# Patient Record
Sex: MALE | Race: WHITE | NOT HISPANIC OR LATINO | Employment: OTHER
[De-identification: names, ages, dates, MRNs, and addresses within clinical notes are randomized per-mention and may not be internally consistent; named-entity substitution may affect disease eponyms.]

---

## 2017-01-31 ENCOUNTER — OFFICE VISIT - HEALTHEAST (OUTPATIENT)
Dept: PODIATRY | Age: 47
End: 2017-01-31

## 2017-01-31 DIAGNOSIS — G57.92 NEURITIS OF LEFT FOOT: ICD-10-CM

## 2021-05-26 ENCOUNTER — RECORDS - HEALTHEAST (OUTPATIENT)
Dept: ADMINISTRATIVE | Facility: CLINIC | Age: 51
End: 2021-05-26

## 2021-05-27 ENCOUNTER — RECORDS - HEALTHEAST (OUTPATIENT)
Dept: ADMINISTRATIVE | Facility: CLINIC | Age: 51
End: 2021-05-27

## 2021-05-28 ENCOUNTER — RECORDS - HEALTHEAST (OUTPATIENT)
Dept: ADMINISTRATIVE | Facility: CLINIC | Age: 51
End: 2021-05-28

## 2021-05-29 ENCOUNTER — RECORDS - HEALTHEAST (OUTPATIENT)
Dept: ADMINISTRATIVE | Facility: CLINIC | Age: 51
End: 2021-05-29

## 2021-05-30 ENCOUNTER — RECORDS - HEALTHEAST (OUTPATIENT)
Dept: ADMINISTRATIVE | Facility: CLINIC | Age: 51
End: 2021-05-30

## 2021-05-31 ENCOUNTER — RECORDS - HEALTHEAST (OUTPATIENT)
Dept: ADMINISTRATIVE | Facility: CLINIC | Age: 51
End: 2021-05-31

## 2021-06-08 NOTE — PROGRESS NOTES
Assessment: Left foot neuritis    Plan: Symptoms are improving already. He is content to wait this out. He understands it may take more than a month to completely regain sensation in this area. If symptoms fail to improve, return to clinic. We might consider a cortisone injection or removing the tiny lipoma.      Subjective: New patient visit at the [facility] regarding altered sensation on the medial side of his left heel. This started after a normal hockey game about two weeks ago. He does not recall any injury. The next day he had exquisite tenderness with weightbearing on the heel. He was seen in the emergency room on January 18. X-rays and ultrasound were unremarkable. Pain was improving with Percocet. He only took that twice. Today, he still has a patch of numbness on the medial and plantar side of the left heel. Pain is gone. He has been able to walk and return to hockey without pain. There is a small soft tissue prominence where the pain started. He is not sure how long it been there. There was never any open wound, bruising or other discoloration.    Physical Exam:  General: Pleasant 46 y.o. male in no acute distress.  Vascular: DP pulses are palpable. PT pulses are palpable. Pedal hair is present. Feet are warm to the touch.  Neuro: Sensation in the feet is altered. He points through the distribution of the plantar calcaneal nerve as numb today. Sensation to the toes and the rest of the plantar foot is intact.  Derm: No open lesions.  Musculoskeletal: On the medial side of the heel there is a tiny subdermal soft tissue prominence which feels like a lipoma. This sits over the calcaneal branch of the posterior tibial nerve. No tenderness there today. X-ray and ultrasound results from the emergency room are reviewed and are negative.    Medical History  Past Medical History   Diagnosis Date     Anxiety      Hiatal hernia      Hypertension      Neuropathy with IgA monoclonal gammopathy     Surgical  History   has a past surgical history that includes Appendectomy.   Social History  Reviewed, and he  reports that he has never smoked. He does not have any smokeless tobacco history on file. He reports that he drinks alcohol. He reports that he uses illicit drugs.   Allergies  Allergies   Allergen Reactions     Ibuprofen Other (See Comments)     Kidney problems   IGA nephrothy    Family History  family history includes Depression in his mother; Diabetes in his maternal grandfather, maternal grandmother, and mother; Drug abuse in his mother; Heart disease in his maternal grandfather, maternal grandmother, and paternal grandmother; Hypertension in his maternal grandfather, maternal grandmother, and paternal grandmother.      Medications  Current Outpatient Prescriptions   Medication Sig Dispense Refill     aspirin 325 MG EC tablet Take 650 mg by mouth.       ketoconazole (NIZORAL) 2 % cream Apply topically.       acetaminophen (TYLENOL) 500 MG tablet Take 500-1,000 mg by mouth every 6 (six) hours as needed for pain.       atorvastatin (LIPITOR) 40 MG tablet TK 1 T PO QD WITH DINNER  1     diazepam (VALIUM) 5 MG tablet Take 5 mg by mouth every 6 (six) hours as needed for anxiety.       lisinopril-hydrochlorothiazide (PRINZIDE,ZESTORETIC) 10-12.5 mg per tablet TK 1 T PO QD  1     metoprolol (LOPRESSOR) 25 MG tablet Take 1 tablet (25 mg total) by mouth 2 (two) times a day. 60 tablet 1     No current facility-administered medications for this visit.          Review of Systems:  A 12 point comprehensive review of systems was negative except as noted.

## 2021-08-23 ENCOUNTER — APPOINTMENT (OUTPATIENT)
Dept: CT IMAGING | Facility: HOSPITAL | Age: 51
End: 2021-08-23
Attending: EMERGENCY MEDICINE
Payer: COMMERCIAL

## 2021-08-23 ENCOUNTER — HOSPITAL ENCOUNTER (EMERGENCY)
Facility: HOSPITAL | Age: 51
Discharge: HOME | End: 2021-08-24
Attending: EMERGENCY MEDICINE | Admitting: EMERGENCY MEDICINE
Payer: COMMERCIAL

## 2021-08-23 VITALS
BODY MASS INDEX: 28.98 KG/M2 | HEART RATE: 74 BPM | SYSTOLIC BLOOD PRESSURE: 186 MMHG | DIASTOLIC BLOOD PRESSURE: 97 MMHG | OXYGEN SATURATION: 97 % | WEIGHT: 185 LBS | RESPIRATION RATE: 29 BRPM | TEMPERATURE: 98.7 F

## 2021-08-23 DIAGNOSIS — E86.0 DEHYDRATION: ICD-10-CM

## 2021-08-23 DIAGNOSIS — R19.7 NAUSEA VOMITING AND DIARRHEA: ICD-10-CM

## 2021-08-23 DIAGNOSIS — R80.9 PROTEINURIA, UNSPECIFIED TYPE: ICD-10-CM

## 2021-08-23 DIAGNOSIS — R11.2 NAUSEA VOMITING AND DIARRHEA: ICD-10-CM

## 2021-08-23 LAB
ALBUMIN UR-MCNC: 300 MG/DL
ANION GAP SERPL CALCULATED.3IONS-SCNC: 12 MMOL/L (ref 5–18)
APPEARANCE UR: CLEAR
BILIRUB UR QL STRIP: NEGATIVE
BUN SERPL-MCNC: 18 MG/DL (ref 8–22)
CALCIUM SERPL-MCNC: 10.6 MG/DL (ref 8.5–10.5)
CHLORIDE BLD-SCNC: 103 MMOL/L (ref 98–107)
CO2 SERPL-SCNC: 25 MMOL/L (ref 22–31)
COLOR UR AUTO: YELLOW
CREAT SERPL-MCNC: 0.91 MG/DL (ref 0.7–1.3)
ERYTHROCYTE [DISTWIDTH] IN BLOOD BY AUTOMATED COUNT: 13.3 % (ref 10–15)
GFR SERPL CREATININE-BSD FRML MDRD: >90 ML/MIN/1.73M2
GLUCOSE BLD-MCNC: 141 MG/DL (ref 70–125)
GLUCOSE UR STRIP-MCNC: NEGATIVE MG/DL
HCT VFR BLD AUTO: 49 % (ref 40–53)
HGB BLD-MCNC: 16.5 G/DL (ref 13.3–17.7)
HGB UR QL STRIP: ABNORMAL
HOLD SPECIMEN: NORMAL
KETONES UR STRIP-MCNC: 40 MG/DL
LEUKOCYTE ESTERASE UR QL STRIP: NEGATIVE
MCH RBC QN AUTO: 30.5 PG (ref 26.5–33)
MCHC RBC AUTO-ENTMCNC: 33.7 G/DL (ref 31.5–36.5)
MCV RBC AUTO: 91 FL (ref 78–100)
MUCOUS THREADS #/AREA URNS LPF: PRESENT /LPF
NITRATE UR QL: NEGATIVE
PH UR STRIP: 5.5 [PH] (ref 5–7)
PLATELET # BLD AUTO: 244 10E3/UL (ref 150–450)
POTASSIUM BLD-SCNC: 4.3 MMOL/L (ref 3.5–5)
RBC # BLD AUTO: 5.41 10E6/UL (ref 4.4–5.9)
RBC URINE: 1 /HPF
SARS-COV-2 RNA RESP QL NAA+PROBE: NEGATIVE
SODIUM SERPL-SCNC: 140 MMOL/L (ref 136–145)
SP GR UR STRIP: 1.03 (ref 1–1.03)
UROBILINOGEN UR STRIP-MCNC: <2 MG/DL
WBC # BLD AUTO: 12.9 10E3/UL (ref 4–11)
WBC URINE: 3 /HPF

## 2021-08-23 PROCEDURE — 99285 EMERGENCY DEPT VISIT HI MDM: CPT | Mod: 25

## 2021-08-23 PROCEDURE — 81001 URINALYSIS AUTO W/SCOPE: CPT | Performed by: EMERGENCY MEDICINE

## 2021-08-23 PROCEDURE — 74176 CT ABD & PELVIS W/O CONTRAST: CPT

## 2021-08-23 PROCEDURE — 96374 THER/PROPH/DIAG INJ IV PUSH: CPT

## 2021-08-23 PROCEDURE — 250N000013 HC RX MED GY IP 250 OP 250 PS 637: Performed by: EMERGENCY MEDICINE

## 2021-08-23 PROCEDURE — 250N000009 HC RX 250: Performed by: EMERGENCY MEDICINE

## 2021-08-23 PROCEDURE — 85027 COMPLETE CBC AUTOMATED: CPT | Performed by: STUDENT IN AN ORGANIZED HEALTH CARE EDUCATION/TRAINING PROGRAM

## 2021-08-23 PROCEDURE — 250N000011 HC RX IP 250 OP 636: Performed by: STUDENT IN AN ORGANIZED HEALTH CARE EDUCATION/TRAINING PROGRAM

## 2021-08-23 PROCEDURE — 250N000011 HC RX IP 250 OP 636: Performed by: EMERGENCY MEDICINE

## 2021-08-23 PROCEDURE — 96375 TX/PRO/DX INJ NEW DRUG ADDON: CPT

## 2021-08-23 PROCEDURE — 96361 HYDRATE IV INFUSION ADD-ON: CPT

## 2021-08-23 PROCEDURE — C9803 HOPD COVID-19 SPEC COLLECT: HCPCS

## 2021-08-23 PROCEDURE — 36415 COLL VENOUS BLD VENIPUNCTURE: CPT | Performed by: STUDENT IN AN ORGANIZED HEALTH CARE EDUCATION/TRAINING PROGRAM

## 2021-08-23 PROCEDURE — 87635 SARS-COV-2 COVID-19 AMP PRB: CPT | Performed by: EMERGENCY MEDICINE

## 2021-08-23 PROCEDURE — 80048 BASIC METABOLIC PNL TOTAL CA: CPT | Performed by: EMERGENCY MEDICINE

## 2021-08-23 PROCEDURE — 85027 COMPLETE CBC AUTOMATED: CPT | Performed by: EMERGENCY MEDICINE

## 2021-08-23 PROCEDURE — 81003 URINALYSIS AUTO W/O SCOPE: CPT | Performed by: STUDENT IN AN ORGANIZED HEALTH CARE EDUCATION/TRAINING PROGRAM

## 2021-08-23 PROCEDURE — 258N000003 HC RX IP 258 OP 636: Performed by: EMERGENCY MEDICINE

## 2021-08-23 RX ORDER — ONDANSETRON 2 MG/ML
8 INJECTION INTRAMUSCULAR; INTRAVENOUS ONCE
Status: COMPLETED | OUTPATIENT
Start: 2021-08-23 | End: 2021-08-23

## 2021-08-23 RX ORDER — ONDANSETRON 4 MG/1
4-8 TABLET, ORALLY DISINTEGRATING ORAL EVERY 8 HOURS PRN
Qty: 16 TABLET | Refills: 0 | Status: SHIPPED | OUTPATIENT
Start: 2021-08-23 | End: 2021-08-27

## 2021-08-23 RX ORDER — ONDANSETRON 4 MG/1
4 TABLET, ORALLY DISINTEGRATING ORAL ONCE
Status: COMPLETED | OUTPATIENT
Start: 2021-08-23 | End: 2021-08-23

## 2021-08-23 RX ORDER — SODIUM CHLORIDE, SODIUM LACTATE, POTASSIUM CHLORIDE, CALCIUM CHLORIDE 600; 310; 30; 20 MG/100ML; MG/100ML; MG/100ML; MG/100ML
INJECTION, SOLUTION INTRAVENOUS CONTINUOUS
Status: DISCONTINUED | OUTPATIENT
Start: 2021-08-23 | End: 2021-08-24 | Stop reason: HOSPADM

## 2021-08-23 RX ORDER — LOSARTAN POTASSIUM 50 MG/1
50 TABLET ORAL DAILY
Status: DISCONTINUED | OUTPATIENT
Start: 2021-08-24 | End: 2021-08-23

## 2021-08-23 RX ORDER — LOSARTAN POTASSIUM 50 MG/1
50 TABLET ORAL DAILY
Status: DISCONTINUED | OUTPATIENT
Start: 2021-08-23 | End: 2021-08-24 | Stop reason: HOSPADM

## 2021-08-23 RX ADMIN — SODIUM CHLORIDE, POTASSIUM CHLORIDE, SODIUM LACTATE AND CALCIUM CHLORIDE 1000 ML: 600; 310; 30; 20 INJECTION, SOLUTION INTRAVENOUS at 21:29

## 2021-08-23 RX ADMIN — LOSARTAN POTASSIUM 50 MG: 50 TABLET, FILM COATED ORAL at 23:21

## 2021-08-23 RX ADMIN — SODIUM CHLORIDE, POTASSIUM CHLORIDE, SODIUM LACTATE AND CALCIUM CHLORIDE 1000 ML: 600; 310; 30; 20 INJECTION, SOLUTION INTRAVENOUS at 21:28

## 2021-08-23 RX ADMIN — ONDANSETRON 4 MG: 4 TABLET, ORALLY DISINTEGRATING ORAL at 18:45

## 2021-08-23 RX ADMIN — FAMOTIDINE 20 MG: 10 INJECTION, SOLUTION INTRAVENOUS at 21:24

## 2021-08-23 RX ADMIN — ONDANSETRON 8 MG: 2 INJECTION INTRAMUSCULAR; INTRAVENOUS at 21:21

## 2021-08-23 ASSESSMENT — ENCOUNTER SYMPTOMS
FEVER: 0
DIAPHORESIS: 1
CHILLS: 1
DYSURIA: 0
DIARRHEA: 1
FREQUENCY: 0
NAUSEA: 1
VOMITING: 1

## 2021-08-24 NOTE — ED NOTES
Pt reports much improvement in nausea. No need for more antiemetics at this time. Pt will be going to CT.

## 2021-08-24 NOTE — ED NOTES
Pt reports emesis x 3 and continued dry heaves after zofran dose while in triage. He denies ETOH use but reports daily use of marijuana. Missed BP med today due to vomiting. /117. Provider updated.

## 2021-08-24 NOTE — ED PROVIDER NOTES
Emergency Department Encounter     Evaluation Date & Time:   8/23/2021  8:32 PM    CHIEF COMPLAINT:  Chills and Vomiting      Triage Note:Woke up at 0300 diaphoretic and vomiting.   No cough or sob.         Impression and Plan     ED COURSE & MEDICAL DECISION MAKING:      ED Course as of Aug 23 2329   Mon Aug 23, 2021   2105 Patient with recurrent nausea vomiting and diarrhea today.  Labs done in triage do show mildly elevated white blood cell count.  They did a urinalysis on him but he has no urinary symptoms and it appears extremely concentrated with protein.  He does have a history of high blood pressure and so he may need follow-up for the protein in his urine but they can also help and will sample is so concentrated including the ketones from recurrent emesis.  For now, his abdomen is benign, but given his persistent chills today and his age I would do CT to rule out infectious cause such as diverticulitis or colitis.  Most recent travel was 2 weeks ago but he has not had any symptoms until just yesterday so I think doubtful was secondary to any travel related diarrhea.  He was just in the \[facility]\"" for [employer].  Otherwise, he was exposed to a large number of people 2 nights ago for a work-related event.  He is concerned for Covid.  He does not know of anyone in particular who had it.  He had no symptoms yesterday so doubtful this is food related from that event and his wife is not sick today to suggest something that they ate together over the course of the last 24 hours.  Causing his symptoms.  If CT is negative this may end up being viral.  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  No other street drug use and he does not drink alcohol.  No tongue fasciculations or tremulousness on exam.      2203 Improved after zofran declined promethazine at this time.       2317 Patient requested his daily dose of losartan which he had previously vomited.  This was given to him.  Patient CT is unremarkable.  No chest pain or shortness of breath to suggest that the cause of his symptoms is related to his blood pressure.  Neuro examination is normal.  Patient feels much improved after his initial fluids and Zofran.  He is comfortable with the plan to discharge home.            9:00 PM I met with the patient, obtained history, performed an initial exam, and discussed options and plan for diagnostics and treatment here in the ED.    At the conclusion of the encounter I discussed the results of all the tests and the disposition. The questions were answered. The patient or family acknowledged understanding and was agreeable with the care plan.        FINAL IMPRESSION:    ICD-10-CM    1. Nausea vomiting and diarrhea  R11.2     R19.7    2. Dehydration  E86.0    3. Proteinuria, unspecified type  R80.9        0 minutes of critical care time        MEDICATIONS GIVEN IN THE EMERGENCY DEPARTMENT:  Medications   lactated ringers BOLUS 1,000 mL (1,000 mLs Intravenous New Bag 8/23/21 2129)     Followed by   lactated ringers BOLUS 1,000 mL (0 mLs Intravenous Stopped 8/23/21 2320)     Followed by   lactated ringers infusion (has no administration in time range)   promethazine (PHENERGAN) 25 mg in sodium chloride 0.9 % 50 mL intermittent infusion (has no administration in time range)   losartan (COZAAR) tablet 50 mg (50 mg Oral Given 8/23/21 2321)   ondansetron (ZOFRAN-ODT) ODT tab 4 mg (4 mg Oral Given 8/23/21 1845)   ondansetron (ZOFRAN) injection 8 mg (8 mg Intravenous Given 8/23/21 2121)   famotidine (PEPCID) injection 20 mg (20 mg Intravenous Given 8/23/21 2124)       NEW PRESCRIPTIONS STARTED AT TODAY'S ED VISIT:  New Prescriptions    ONDANSETRON (ZOFRAN ODT) 4 MG ODT TAB    Take 1-2 tablets (4-8 mg) by mouth every 8 hours as needed for nausea or vomiting (max daily dose of 4 tablets)       HPI      HPI     Joseph Ochoa is a 51 year old male with a pertinent history of hyperlipidemia, HTN, gastritis, GERD, and neuropathy who presents to this ED via walk-in for evaluation of chills and vomiting.    Patient reports profuse sweating, chills, diarrhea, vomiting, and heaving, earlier today after he woke up around 1500. Patient reports history of nausea before (x2), however he has never experienced diaphoresis and chills with the nausea. He states that diarrhea was normal-colored. Patient denies fever, dysuria, and frequency. Patient reports he was at a benefit on 8/21/21 with about 20-50 people in attendance. Patient denies any symptoms yesterday    Patient has no history of diverticulitis. Patient reports marijuana use everyday, with yesterday being the last time he used it. Patient occasionally takes Valium, which is prescribed. Patient does not consume alcohol, last drink was in May 2020. Patient currently takes Lisinopril and Omeprazole.     No other complaints at this tie.     REVIEW OF SYSTEMS:  Review of Systems   Constitutional: Positive for chills and diaphoresis. Negative for fever.   Gastrointestinal: Positive for diarrhea, nausea and vomiting.   Genitourinary: Negative for dysuria and frequency.   All other systems reviewed and are negative.        Medical History     No past medical history on file.    Past Surgical History:   Procedure Laterality Date     APPENDECTOMY         Family History   Problem Relation Age of Onset     Depression Mother      Diabetes Mother      Substance Abuse Mother      Diabetes Maternal Grandmother      Hypertension Maternal Grandmother      Heart Disease Maternal Grandmother      Diabetes Maternal Grandfather      Hypertension Maternal Grandfather      Heart Disease Maternal Grandfather      Heart Disease Paternal Grandmother      Hypertension Paternal Grandmother        Social History     Tobacco Use     Smoking status: Never Smoker     Smokeless tobacco: Never  Used   Substance Use Topics     Alcohol use: Yes     Comment: Alcoholic Drinks/day: occasional     Drug use: Yes     Types: Marijuana     Comment: Drug use: Daily marijuana use       ondansetron (ZOFRAN ODT) 4 MG ODT tab  atorvastatin (LIPITOR) 40 MG tablet  diazepam (VALIUM) 5 MG tablet  losartan (COZAAR) 50 MG tablet  omeprazole (PRILOSEC) 20 MG capsule        Physical Exam     First Vitals:  Patient Vitals for the past 24 hrs:   BP Temp Temp src Pulse Resp SpO2 Weight   08/23/21 2320 -- -- -- 74 29 97 % --   08/23/21 2300 (!) 186/97 -- -- 77 24 97 % --   08/23/21 2247 -- 98.7  F (37.1  C) Oral -- -- -- --   08/23/21 2240 (!) 204/101 -- -- -- -- -- --   08/23/21 2200 (!) 199/97 -- -- 69 25 95 % --   08/23/21 2150 (!) 202/106 -- -- 70 29 97 % --   08/23/21 2100 (!) 250/118 -- -- 68 21 96 % --   08/23/21 2045 (!) 220/107 -- -- 64 25 96 % --   08/23/21 1841 (!) 157/97 96.8  F (36  C) Temporal 67 20 94 % 83.9 kg (185 lb)       PHYSICAL EXAM:   Constitutional:   Sitting upright in bed.    HENT:  Normocephalic, posterior pharynx wnl, mucous membranes tacky and moderately pink   Eyes:  PERRL, EOMI, Conjunctiva normal, No discharge, no scleral icterus.  Respiratory:  Breathing easily, clear to auscultation  Cardiovascular:  RRR, trace systolic murmur, nl s1s2 0 rubs, or gallops.  Peripheral pulses dp, pt, and radial are wnl.  No peripheral edema   GI:  Bowel sounds hypoactive, Soft, No tenderness, No flank tenderness, nondistended.  :No CVA tenderness.   Musculoskeletal:  Moves all extremities.  No erythematous or swollen major joints,   Integument:  Normal  Lymphatic:  No cervical lymphadenopathy  Neurologic:  Alert & oriented x 3, Normal motor function, Normal sensory function, No focal deficits noted. Normal speech.  Psychiatric:  Affect normal, Judgment normal, Mood normal.     Results     LAB:  All pertinent labs reviewed and interpreted  Results for orders placed or performed during the hospital encounter of  08/23/21   Abd/pelvis CT no contrast - Stone Protocol     Status: None    Narrative    EXAM: CT ABDOMEN PELVIS W/O CONTRAST  LOCATION: [facility]  DATE/TIME: 8/23/2021 10:28 PM    INDICATION: recurrent n/v/d.  has hx/o daily marijuana use, but had chills all day today.  COMPARISON: 12/30/1950  TECHNIQUE: CT scan of the abdomen and pelvis was performed without IV contrast. Multiplanar reformats were obtained. Dose reduction techniques were used.  CONTRAST: None.    FINDINGS:   LOWER CHEST: Normal.    HEPATOBILIARY: Tiny hepatic cysts.    PANCREAS: Normal.    SPLEEN: Normal.    ADRENAL GLANDS: Normal.    KIDNEYS/BLADDER: Normal.    BOWEL: Colon is mostly collapsed and empty consistent with diarrhea history. There is no convincing colonic wall thickening. No bowel obstruction.    LYMPH NODES: Normal.    VASCULATURE: Unremarkable.    PELVIC ORGANS: Normal.    MUSCULOSKELETAL: Normal.      Impression    IMPRESSION:   1.  No etiology for symptoms. Nothing obstructive or inflammatory involving bowel.     UA with Microscopic reflex to Culture     Status: Abnormal    Specimen: Urine, Midstream   Result Value Ref Range    Color Urine Yellow Colorless, Straw, Light Yellow, Yellow    Appearance Urine Clear Clear    Glucose Urine Negative Negative mg/dL    Bilirubin Urine Negative Negative    Ketones Urine 40  (A) Negative mg/dL    Specific Gravity Urine 1.034 (H) 1.001 - 1.030    Blood Urine 0.03 mg/dL (A) Negative    pH Urine 5.5 5.0 - 7.0    Protein Albumin Urine 300  (A) Negative mg/dL    Urobilinogen Urine <2.0 <2.0 mg/dL    Nitrite Urine Negative Negative    Leukocyte Esterase Urine Negative Negative    Mucus Urine Present (A) None Seen /LPF    RBC Urine 1 <=2 /HPF    WBC Urine 3 <=5 /HPF    Narrative    Urine Culture not indicated   CBC (+ platelets, no diff)     Status: Abnormal   Result Value Ref Range    WBC Count 12.9 (H) 4.0 - 11.0 10e3/uL    RBC Count 5.41 4.40 - 5.90 10e6/uL    Hemoglobin  16.5 13.3 - 17.7 g/dL    Hematocrit 49.0 40.0 - 53.0 %    MCV 91 78 - 100 fL    MCH 30.5 26.5 - 33.0 pg    MCHC 33.7 31.5 - 36.5 g/dL    RDW 13.3 10.0 - 15.0 %    Platelet Count 244 150 - 450 10e3/uL   Basic metabolic panel     Status: Abnormal   Result Value Ref Range    Sodium 140 136 - 145 mmol/L    Potassium 4.3 3.5 - 5.0 mmol/L    Chloride 103 98 - 107 mmol/L    Carbon Dioxide (CO2) 25 22 - 31 mmol/L    Anion Gap 12 5 - 18 mmol/L    Urea Nitrogen 18 8 - 22 mg/dL    Creatinine 0.91 0.70 - 1.30 mg/dL    Calcium 10.6 (H) 8.5 - 10.5 mg/dL    Glucose 141 (H) 70 - 125 mg/dL    GFR Estimate >90 >60 mL/min/1.73m2   Extra Red Top Tube     Status: None   Result Value Ref Range    Hold Specimen JIC    Extra Green Top (Lithium Heparin) Tube     Status: None   Result Value Ref Range    Hold Specimen JIC    Extra Purple Top Tube     Status: None   Result Value Ref Range    Hold Specimen JIC    Symptomatic COVID-19 Virus (Coronavirus) by PCR Nasopharyngeal     Status: Normal    Specimen: Nasopharyngeal; Swab   Result Value Ref Range    SARS CoV2 PCR Negative Negative    Narrative    Testing was performed using the rell  SARS-CoV-2 & Influenza A/B Assay on the rell  Goldie  System.  This test should be ordered for the detection of SARS-COV-2 in individuals who meet SARS-CoV-2 clinical and/or epidemiological criteria. Test performance is unknown in asymptomatic patients.  This test is for in vitro diagnostic use under the FDA EUA for laboratories certified under CLIA to perform moderate and/or high complexity testing. This test has not been FDA cleared or approved.  A negative test does not rule out the presence of PCR inhibitors in the specimen or target RNA in concentration below the limit of detection for the assay. The possibility of a false negative should be considered if the patient's recent exposure or clinical presentation suggests COVID-19.  [facility] are certified under the Clinical Laboratory  Improvement Amendments of 1988 (CLIA-88) as qualified to perform moderate and/or high complexity laboratory testing.   [place] Draw     Status: None    Narrative    The following orders were created for panel order [place] Draw.  Procedure                               Abnormality         Status                     ---------                               -----------         ------                     Extra Red Top Tube[[id number]]                               Final result               Extra Green Top (Lithium...[[id number]]                      Final result               Extra Purple Top Tube[[id number]]                            Final result                 Please view results for these tests on the individual orders.       RADIOLOGY:  Abd/pelvis CT no contrast - Stone Protocol   Final Result   IMPRESSION:    1.  No etiology for symptoms. Nothing obstructive or inflammatory involving bowel.                  The creation of this record is based on the scribe s observations of the work being performed by Rose Simental and the provider s statements to them. This document has been checked and approved by MD Little Mcdermott MD  Emergency Medicine  [facility] EMERGENCY DEPARTMENT       Little Partida MD  08/23/21 3241

## 2021-08-24 NOTE — DISCHARGE INSTRUCTIONS
Your Covid test today is negative but if you are still running fevers in 3 days at that point I would recommend that you get retested before returning to the work environment and until then you should stay isolated at home.    In the meantime you can use over-the-counter Imodium for diarrhea or if you prefer certainly you can also use Pepto-Bismol.  If you take Pepto-Bismol please be aware that that will turn your stools quite dark blackish green.    Use the Zofran/ondansetron as needed for the nausea.    You should see your primary care physician just to retest your urine once you are feeling improved.  There was a little bit of protein in your urine which is most likely because you were severely dehydrated with this, but it can occasionally occur and is a warning sign of early kidney disease related to high blood pressure.  A simple recheck of your urine will be helpful done through your clinic once you are recovered.

## 2021-10-18 PROCEDURE — 88305 TISSUE EXAM BY PATHOLOGIST: CPT | Mod: TC,ORL | Performed by: SURGERY

## 2021-10-19 ENCOUNTER — LAB REQUISITION (OUTPATIENT)
Dept: LAB | Facility: CLINIC | Age: 51
End: 2021-10-19
Payer: COMMERCIAL

## 2021-10-21 LAB
PATH REPORT.COMMENTS IMP SPEC: NORMAL
PATH REPORT.COMMENTS IMP SPEC: NORMAL
PATH REPORT.FINAL DX SPEC: NORMAL
PATH REPORT.GROSS SPEC: NORMAL
PATH REPORT.MICROSCOPIC SPEC OTHER STN: NORMAL
PATH REPORT.RELEVANT HX SPEC: NORMAL
PHOTO IMAGE: NORMAL

## 2021-10-21 PROCEDURE — 88305 TISSUE EXAM BY PATHOLOGIST: CPT | Mod: 26 | Performed by: PATHOLOGY

## 2022-07-23 ENCOUNTER — HOSPITAL ENCOUNTER (EMERGENCY)
Facility: HOSPITAL | Age: 52
Discharge: HOME | End: 2022-07-23
Attending: EMERGENCY MEDICINE | Admitting: EMERGENCY MEDICINE
Payer: COMMERCIAL

## 2022-07-23 VITALS
SYSTOLIC BLOOD PRESSURE: 165 MMHG | HEART RATE: 85 BPM | TEMPERATURE: 98.2 F | RESPIRATION RATE: 22 BRPM | WEIGHT: 183 LBS | OXYGEN SATURATION: 95 % | DIASTOLIC BLOOD PRESSURE: 104 MMHG | BODY MASS INDEX: 28.66 KG/M2

## 2022-07-23 DIAGNOSIS — S46.119A RUPTURE OF PROXIMAL BICEPS TENDON, UNSPECIFIED LATERALITY, INITIAL ENCOUNTER: ICD-10-CM

## 2022-07-23 PROBLEM — N02.B9 NEPHROPATHY, IGA: Status: ACTIVE | Noted: 2022-07-23

## 2022-07-23 PROBLEM — Z80.9 FAMILY HISTORY OF CANCER: Status: ACTIVE | Noted: 2022-07-23

## 2022-07-23 PROBLEM — I10 ESSENTIAL HYPERTENSION: Status: ACTIVE | Noted: 2019-01-22

## 2022-07-23 PROBLEM — N40.0 BPH WITHOUT URINARY OBSTRUCTION: Status: ACTIVE | Noted: 2018-03-26

## 2022-07-23 PROBLEM — M79.643 HAND PAIN: Status: ACTIVE | Noted: 2022-07-23

## 2022-07-23 PROBLEM — K21.9 GASTROESOPHAGEAL REFLUX DISEASE WITHOUT ESOPHAGITIS: Status: ACTIVE | Noted: 2018-04-10

## 2022-07-23 PROBLEM — F41.0 PANIC DISORDER: Status: ACTIVE | Noted: 2022-07-23

## 2022-07-23 PROCEDURE — 99284 EMERGENCY DEPT VISIT MOD MDM: CPT

## 2022-07-23 PROCEDURE — 250N000013 HC RX MED GY IP 250 OP 250 PS 637: Performed by: EMERGENCY MEDICINE

## 2022-07-23 RX ORDER — CYCLOBENZAPRINE HCL 10 MG
10 TABLET ORAL 3 TIMES DAILY PRN
Qty: 20 TABLET | Refills: 0 | Status: SHIPPED | OUTPATIENT
Start: 2022-07-23 | End: 2022-07-29

## 2022-07-23 RX ORDER — OXYCODONE AND ACETAMINOPHEN 5; 325 MG/1; MG/1
2 TABLET ORAL ONCE
Status: COMPLETED | OUTPATIENT
Start: 2022-07-23 | End: 2022-07-23

## 2022-07-23 RX ORDER — OXYCODONE AND ACETAMINOPHEN 5; 325 MG/1; MG/1
1 TABLET ORAL EVERY 6 HOURS PRN
Qty: 12 TABLET | Refills: 0 | Status: SHIPPED | OUTPATIENT
Start: 2022-07-23

## 2022-07-23 RX ADMIN — OXYCODONE HYDROCHLORIDE AND ACETAMINOPHEN 2 TABLET: 5; 325 TABLET ORAL at 12:23

## 2022-07-23 NOTE — ED PROVIDER NOTES
EMERGENCY DEPARTMENT ENCOUNTER      NAME: Joseph Ochoa  AGE: 52 year old male  YOB: 1970  MRN: [id number]  EVALUATION DATE & TIME: No admission date for patient encounter.    PCP: Osvaldo Telles    ED PROVIDER: Evaristo Quinonez MD    Chief Complaint   Patient presents with     Arm Injury     FINAL IMPRESSION:  1. Rupture of proximal biceps tendon, unspecified laterality, initial encounter        ED COURSE & MEDICAL DECISION MAKING:    Pertinent Labs & Imaging studies reviewed. (See chart for details)  52 year old male presents to the Emergency Department for evaluation of lifting injury to the right upper arm.  Patient felt a popping sensation in his biceps and is noted a deformity to the muscle since that occurred.  On clinical examination patient had findings consistent with a biceps tendon rupture likely proximal.  Distal neurovascular was intact.  Case was discussed with [facility].  Patient was placed in a sling with plan for MRI outpatient and quick orthopedic follow-up.  This was all discussed with the patient.  At this point I did not feel that imaging was indicated and patient can be discharged with close outpatient follow-up and pain management.     11:27 AM I met with the patient, obtained history, performed an initial exam, and discussed options and plan for diagnostics and treatment here in the ED.  11:41 AM I spoke with [facility] to PA.  12:33 PM I spoke with [facility] to Dr. Nixon.    At the conclusion of the encounter I discussed the results of all of the tests and the disposition. The questions were answered. The patient or family acknowledged understanding and was agreeable with the care plan.     MEDICATIONS GIVEN IN THE EMERGENCY:  Medications   oxyCODONE-acetaminophen (PERCOCET) 5-325 MG per tablet 2 tablet (2 tablets Oral Given 7/23/22 1223)     NEW PRESCRIPTIONS STARTED AT TODAY'S ER VISIT  Discharge Medication List as of 7/23/2022 12:24 PM     "  START taking these medications    Details   cyclobenzaprine (FLEXERIL) 10 MG tablet Take 1 tablet (10 mg) by mouth 3 times daily as needed for muscle spasms, Disp-20 tablet, R-0, Local Print      oxyCODONE-acetaminophen (PERCOCET) 5-325 MG tablet Take 1 tablet by mouth every 6 hours as needed for pain, Disp-12 tablet, R-0, Local Print                =================================================================    HPI    Patient information was obtained from: patient     Use of : N/A         Joseph Ochoa is a 52 year old male with a pertinent history of HTN, carpal tunnel syndrome, metacarpal bone fracture, and panic disorder who presents to this ED via walk-in for evaluation of an arm injury.    Last night while patient was lifting his friend into a car he felt his right bicep \"coil up\". He also noted a \"pop\" when it occurred and he has associated pain. He has taken Tylenol but it has not provided enough relief.      REVIEW OF SYSTEMS   Review of Systems   Musculoskeletal:        Positive for right arm pain        PAST MEDICAL HISTORY:  No past medical history on file.    PAST SURGICAL HISTORY:  Past Surgical History:   Procedure Laterality Date     APPENDECTOMY             CURRENT MEDICATIONS:    oxyCODONE-acetaminophen (PERCOCET) 5-325 MG tablet  atorvastatin (LIPITOR) 40 MG tablet  diazepam (VALIUM) 5 MG tablet  losartan (COZAAR) 50 MG tablet  omeprazole (PRILOSEC) 20 MG capsule        ALLERGIES:  Allergies   Allergen Reactions     Ibuprofen Other (See Comments)     Kidney problems   IGA nephrothy     Lisinopril Other (See Comments)       FAMILY HISTORY:  Family History   Problem Relation Age of Onset     Depression Mother      Diabetes Mother      Substance Abuse Mother      Diabetes Maternal Grandmother      Hypertension Maternal Grandmother      Heart Disease Maternal Grandmother      Diabetes Maternal Grandfather      Hypertension Maternal Grandfather      Heart Disease Maternal " Grandfather      Heart Disease Paternal Grandmother      Hypertension Paternal Grandmother        SOCIAL HISTORY:   Social History     Socioeconomic History     Marital status:    Tobacco Use     Smoking status: Never Smoker     Smokeless tobacco: Never Used   Substance and Sexual Activity     Alcohol use: Yes     Comment: Alcoholic Drinks/day: occasional     Drug use: Yes     Types: Marijuana     Comment: Drug use: Daily marijuana use     Sexual activity: Yes     Partners: Female       VITALS:  BP (!) 165/104   Pulse 85   Temp 98.2  F (36.8  C) (Oral)   Resp 22   Wt 83 kg (183 lb)   SpO2 95%   BMI 28.66 kg/m      PHYSICAL EXAM    PHYSICAL EXAM    VITAL SIGNS: BP (!) 165/104   Pulse 85   Temp 98.2  F (36.8  C) (Oral)   Resp 22   Wt 83 kg (183 lb)   SpO2 95%   BMI 28.66 kg/m    Constitutional:  Well developed, well nourished  EYES: Conjunctivae clear, no discharge  HENT: Atraumatic, normocephalic, bilateral external ears normal.  Oropharynx moist. Nose normal.   Neck: Normal ROM , Supple   Respiratory:  No respiratory distress, normal nonlabored respirations.   Cardiovascular:  Distal perfusion appears intact  Musculoskeletal: On examination of the right upper extremity is consistent with a high probability biceps tendon rupture.  Distal neurovascular is intact  Integument:  Warm, Dry, No erythema, No rash.   Neurologic:  Alert and oriented. No focal deficits noted.  Ambulatory  Psychiatric:  Affect normal, Judgment normal, Mood normal.    RADIOLOGY:  Reviewed all pertinent imaging. Please see official radiology report.  MR Shoulder Right w/o Contrast    (Results Pending)         I, Arnaud Fuentes, am serving as a scribe to document services personally performed by Evaristo Quinonez MD based on my observation and the provider's statements to me. I, Evaristo Quinonez MD, attest that Arnaud Fuentes is acting in a scribe capacity, has observed my performance of the services and has documented them in  accordance with my direction.    Evaristo Quinonez MD  [facility] EMERGENCY DEPARTMENT  [facility] [facility] [id number] [phone]     Evaristo Quinonez MD  08/08/22 0801

## 2022-07-23 NOTE — DISCHARGE INSTRUCTIONS
Based on your examination, your pain is most consistent with a proximal biceps tendon rupture.  This will need to be further evaluated by MRI on an urgent basis with planned orthopedic follow-up next week post MRI.  Your case was discussed with orthopedics.  A MRI order has been placed in the system for your right shoulder.  Please call today to schedule MRI within the next few days as soon as available and then once the MRI is scheduled please call orthopedics at [phone] your exam and presentation was discussed with Dr. Quang Stout's orthopedic team and they would like to see you in follow-up once the MRI is completed.  Take pain medications and muscle relaxants as prescribed for management of your symptoms ice and rest injured area utilize sling for support if any escalation your symptoms or additional concern develops please return to the emergency department for repeat assessment.

## 2022-07-23 NOTE — ED TRIAGE NOTES
"Pt was trying to lift a person off the ground last evening and felt a \"pop\" and pain in his right bicep. Pain at rest is 3/10 and much worse with movement.      Triage Assessment     Row Name 07/23/22 1121       Triage Assessment (Adult)    Airway WDL WDL       Respiratory WDL    Respiratory WDL WDL       Skin Circulation/Temperature WDL    Skin Circulation/Temperature WDL WDL       Cardiac WDL    Cardiac WDL X  hx HTN       Peripheral/Neurovascular WDL    Peripheral Neurovascular WDL WDL       Cognitive/Neuro/Behavioral WDL    Cognitive/Neuro/Behavioral WDL WDL              "

## 2023-04-10 ENCOUNTER — HOSPITAL ENCOUNTER (EMERGENCY)
Facility: HOSPITAL | Age: 53
Discharge: HOME | End: 2023-04-10
Attending: EMERGENCY MEDICINE | Admitting: EMERGENCY MEDICINE
Payer: COMMERCIAL

## 2023-04-10 ENCOUNTER — APPOINTMENT (OUTPATIENT)
Dept: MRI IMAGING | Facility: HOSPITAL | Age: 53
End: 2023-04-10
Attending: EMERGENCY MEDICINE
Payer: COMMERCIAL

## 2023-04-10 ENCOUNTER — APPOINTMENT (OUTPATIENT)
Dept: CT IMAGING | Facility: HOSPITAL | Age: 53
End: 2023-04-10
Attending: STUDENT IN AN ORGANIZED HEALTH CARE EDUCATION/TRAINING PROGRAM
Payer: COMMERCIAL

## 2023-04-10 VITALS
OXYGEN SATURATION: 92 % | TEMPERATURE: 99.2 F | DIASTOLIC BLOOD PRESSURE: 99 MMHG | HEIGHT: 67 IN | WEIGHT: 185 LBS | HEART RATE: 76 BPM | RESPIRATION RATE: 20 BRPM | BODY MASS INDEX: 29.03 KG/M2 | SYSTOLIC BLOOD PRESSURE: 147 MMHG

## 2023-04-10 DIAGNOSIS — R47.9 DIFFICULTY WITH SPEECH: ICD-10-CM

## 2023-04-10 LAB
ANION GAP SERPL CALCULATED.3IONS-SCNC: 12 MMOL/L (ref 7–15)
APTT PPP: 26 SECONDS (ref 22–38)
BASOPHILS # BLD AUTO: 0.1 10E3/UL (ref 0–0.2)
BASOPHILS NFR BLD AUTO: 1 %
BUN SERPL-MCNC: 18 MG/DL (ref 6–20)
CALCIUM SERPL-MCNC: 10.2 MG/DL (ref 8.6–10)
CHLORIDE SERPL-SCNC: 99 MMOL/L (ref 98–107)
CREAT SERPL-MCNC: 0.96 MG/DL (ref 0.67–1.17)
DEPRECATED HCO3 PLAS-SCNC: 26 MMOL/L (ref 22–29)
EOSINOPHIL # BLD AUTO: 0.1 10E3/UL (ref 0–0.7)
EOSINOPHIL NFR BLD AUTO: 2 %
ERYTHROCYTE [DISTWIDTH] IN BLOOD BY AUTOMATED COUNT: 12.8 % (ref 10–15)
GFR SERPL CREATININE-BSD FRML MDRD: >90 ML/MIN/1.73M2
GLUCOSE BLDC GLUCOMTR-MCNC: 121 MG/DL (ref 70–99)
GLUCOSE SERPL-MCNC: 106 MG/DL (ref 70–99)
HCT VFR BLD AUTO: 51.8 % (ref 40–53)
HGB BLD-MCNC: 17.5 G/DL (ref 13.3–17.7)
IMM GRANULOCYTES # BLD: 0 10E3/UL
IMM GRANULOCYTES NFR BLD: 0 %
INR PPP: 0.92 (ref 0.85–1.15)
LYMPHOCYTES # BLD AUTO: 1.9 10E3/UL (ref 0.8–5.3)
LYMPHOCYTES NFR BLD AUTO: 26 %
MCH RBC QN AUTO: 31 PG (ref 26.5–33)
MCHC RBC AUTO-ENTMCNC: 33.8 G/DL (ref 31.5–36.5)
MCV RBC AUTO: 92 FL (ref 78–100)
MONOCYTES # BLD AUTO: 0.8 10E3/UL (ref 0–1.3)
MONOCYTES NFR BLD AUTO: 10 %
NEUTROPHILS # BLD AUTO: 4.6 10E3/UL (ref 1.6–8.3)
NEUTROPHILS NFR BLD AUTO: 61 %
NRBC # BLD AUTO: 0 10E3/UL
NRBC BLD AUTO-RTO: 0 /100
PLATELET # BLD AUTO: 205 10E3/UL (ref 150–450)
POTASSIUM SERPL-SCNC: 4.1 MMOL/L (ref 3.4–5.3)
RBC # BLD AUTO: 5.65 10E6/UL (ref 4.4–5.9)
SODIUM SERPL-SCNC: 137 MMOL/L (ref 136–145)
TROPONIN T SERPL HS-MCNC: 8 NG/L
WBC # BLD AUTO: 7.5 10E3/UL (ref 4–11)

## 2023-04-10 PROCEDURE — 96374 THER/PROPH/DIAG INJ IV PUSH: CPT | Mod: 59

## 2023-04-10 PROCEDURE — 84484 ASSAY OF TROPONIN QUANT: CPT | Performed by: EMERGENCY MEDICINE

## 2023-04-10 PROCEDURE — 70498 CT ANGIOGRAPHY NECK: CPT

## 2023-04-10 PROCEDURE — 85004 AUTOMATED DIFF WBC COUNT: CPT | Performed by: EMERGENCY MEDICINE

## 2023-04-10 PROCEDURE — 70553 MRI BRAIN STEM W/O & W/DYE: CPT

## 2023-04-10 PROCEDURE — 36415 COLL VENOUS BLD VENIPUNCTURE: CPT | Performed by: EMERGENCY MEDICINE

## 2023-04-10 PROCEDURE — 0042T CT HEAD PERFUSION W CONTRAST: CPT

## 2023-04-10 PROCEDURE — 99285 EMERGENCY DEPT VISIT HI MDM: CPT | Mod: 25

## 2023-04-10 PROCEDURE — A9585 GADOBUTROL INJECTION: HCPCS | Performed by: EMERGENCY MEDICINE

## 2023-04-10 PROCEDURE — 93005 ELECTROCARDIOGRAM TRACING: CPT | Performed by: EMERGENCY MEDICINE

## 2023-04-10 PROCEDURE — 70496 CT ANGIOGRAPHY HEAD: CPT

## 2023-04-10 PROCEDURE — 255N000002 HC RX 255 OP 636: Performed by: EMERGENCY MEDICINE

## 2023-04-10 PROCEDURE — 250N000011 HC RX IP 250 OP 636: Performed by: EMERGENCY MEDICINE

## 2023-04-10 PROCEDURE — 85610 PROTHROMBIN TIME: CPT | Performed by: EMERGENCY MEDICINE

## 2023-04-10 PROCEDURE — 85730 THROMBOPLASTIN TIME PARTIAL: CPT | Performed by: EMERGENCY MEDICINE

## 2023-04-10 PROCEDURE — 99207 PR NO CHARGE LOS: CPT | Performed by: NURSE PRACTITIONER

## 2023-04-10 PROCEDURE — 82435 ASSAY OF BLOOD CHLORIDE: CPT | Performed by: EMERGENCY MEDICINE

## 2023-04-10 PROCEDURE — 96376 TX/PRO/DX INJ SAME DRUG ADON: CPT | Mod: 59

## 2023-04-10 PROCEDURE — 82962 GLUCOSE BLOOD TEST: CPT

## 2023-04-10 RX ORDER — GADOBUTROL 604.72 MG/ML
8 INJECTION INTRAVENOUS ONCE
Status: COMPLETED | OUTPATIENT
Start: 2023-04-10 | End: 2023-04-10

## 2023-04-10 RX ORDER — LORAZEPAM 2 MG/ML
1 INJECTION INTRAMUSCULAR ONCE
Status: COMPLETED | OUTPATIENT
Start: 2023-04-10 | End: 2023-04-10

## 2023-04-10 RX ORDER — IOPAMIDOL 755 MG/ML
50 INJECTION, SOLUTION INTRAVASCULAR ONCE
Status: COMPLETED | OUTPATIENT
Start: 2023-04-10 | End: 2023-04-10

## 2023-04-10 RX ORDER — IOPAMIDOL 755 MG/ML
75 INJECTION, SOLUTION INTRAVASCULAR ONCE
Status: COMPLETED | OUTPATIENT
Start: 2023-04-10 | End: 2023-04-10

## 2023-04-10 RX ADMIN — IOPAMIDOL 75 ML: 755 INJECTION, SOLUTION INTRAVENOUS at 11:55

## 2023-04-10 RX ADMIN — LORAZEPAM 1 MG: 2 INJECTION INTRAMUSCULAR; INTRAVENOUS at 13:17

## 2023-04-10 RX ADMIN — LORAZEPAM 1 MG: 2 INJECTION INTRAMUSCULAR; INTRAVENOUS at 16:07

## 2023-04-10 RX ADMIN — GADOBUTROL 8 ML: 604.72 INJECTION INTRAVENOUS at 16:59

## 2023-04-10 RX ADMIN — IOPAMIDOL 50 ML: 755 INJECTION, SOLUTION INTRAVENOUS at 11:58

## 2023-04-10 ASSESSMENT — ENCOUNTER SYMPTOMS
NAUSEA: 0
DIZZINESS: 0
HEADACHES: 0
SHORTNESS OF BREATH: 0
SPEECH DIFFICULTY: 1
DIARRHEA: 0
VOMITING: 0
NUMBNESS: 0
WEAKNESS: 0

## 2023-04-10 ASSESSMENT — ACTIVITIES OF DAILY LIVING (ADL)
ADLS_ACUITY_SCORE: 35

## 2023-04-10 NOTE — ED TRIAGE NOTES
Patient presents her with difficulty expressing himself verbally and aphasia. He woke up at 5 am with symptoms.

## 2023-04-10 NOTE — DISCHARGE INSTRUCTIONS
CT scan and MRI of your head and neck both appear reassuring.  EKG and laboratory tests also appear reassuring.      The exact cause of your symptoms remains unclear.  Anxiety vs. transient ischemic attack (TIA) seem most likely.  It is therefore recommended that you take an 81 mg aspirin on a daily basis.    Follow-up with neurology for reevaluation.  Return back to ED sooner for any worsening speech difficulty, numbness/ting/weakness in her face or extremities, or any other new or concerning symptoms.

## 2023-04-10 NOTE — ED PROVIDER NOTES
EMERGENCY DEPARTMENT ENCOUNTER      NAME: Joseph Ochoa  AGE: 53 year old male  YOB: 1970  MRN: [id number]  EVALUATION DATE & TIME: No admission date for patient encounter.    PCP: Osvaldo Telles    ED PROVIDER: Falguni Manjarrez DO      Chief Complaint   Patient presents with     Aphasia     Tier Two Stroke Code         FINAL IMPRESSION:  1. Difficulty with speech          ED COURSE & MEDICAL DECISION MAKIN-year-old male presented to the ED for evaluation after he woke up with speech and word finding difficulty.  Upon arrival to the ED the patient was noted to be hypertensive with a blood pressure of 173/115.  Patient was otherwise hemodynamically stable.  I was called out to triage to evaluate the patient for possible stroke evaluation.  On my evaluation the patient did not appear to have any word finding difficulty or dysarthria.  No focal neurologic deficits were noted on my exam.      The stroke code was called and the patient's case was discussed with the on-call stroke neurologist.  Initial CT and CTA were both unremarkable.  The findings were discussed with the radiologist.  Case was again discussed with the stroke neurologist and the stroke code was de-escalated.    EKG revealed normal sinus rhythm without any concerning ST or T wave changes.  CBC, BMP, troponin were all reassuring.    The patient was reevaluated and informed of the reassuring lab and head CT scan results.  The patient's physical exam including neurologic exam was unchanged at the time of reevaluation.    MRI of the brain with and without contrast was nondiagnostic.  There was no evidence of an acute infarction noted.    The patient was again reevaluated and informed of the reassuring MRI results.  At the time reevaluation the patient's blood pressure had improved.  The patient was informed that his symptoms could represent a TIA, hypertensive encephalopathy, and/or anxiety.  After reassuring the patient he felt  comfortable returning home.  He was instructed to take a daily 81 mg aspirin.  Outpatient neurology follow-up was recommended.  The patient was instructed to follow-up with his primary care provider for reevaluation of his ongoing hypertension.  The patient was instructed to return back to ED sooner for any worsening neurologic deficits, headaches, or any other new or concerning symptoms.    Pertinent Labs & Imaging studies reviewed. (See chart for details)  11:38 AM I met with the patient to gather history and to perform my initial exam. We discussed plans for the ED course, including diagnostic testing and treatment.   11:49 AM Spoke with Stroke Team.  11:52 AM Spoke with [facility].  12:09 PM Spoke with [facility], CTA/A is normal.  12:09 PM Spoke with Stroke Neurology who recommends to deescalate stroke code.  4:32 PM Spoke with CT Radiologist.  5:53 PM Reevaluated and updated the patient. We discussed the plan for discharge and the patient is agreeable. Reviewed supportive cares, symptomatic treatment, outpatient follow up, and reasons to return to the Emergency Department. Patient to be discharged by ED RN.       At the conclusion of the encounter I discussed the results of all of the tests and the disposition. The questions were answered. The patient or family acknowledged understanding and was agreeable with the care plan.     Medical Decision Making    History:    Supplemental history from: Documented in chart, if applicable    External Record(s) reviewed: Documented in chart, if applicable.    Work Up:    Chart documentation includes differential considered and any EKGs or imaging independently interpreted by provider, where specified.    In additional to work up documented, I considered the following work up: Documented in chart, if applicable.    External consultation:    Discussion of management with another provider: Neurology, Radiology (regarding imaging) and Other: Stroke  "Neurology    Complicating factors:    Care impacted by chronic illness: Hyperlipidemia, Hypertension and Mental Health    Care affected by social determinants of health: N/A    Disposition considerations: Discharge. No recommendations on prescription strength medication(s). I considered admission, but discharged the patient after share decision making conversation.      PPE worn: n95 mask, goggles    MEDICATIONS GIVEN IN THE EMERGENCY:  Medications   LORazepam (ATIVAN) injection 1 mg (1 mg Intravenous $Given 4/10/23 1317)   iopamidol (ISOVUE-370) solution 75 mL (75 mLs Intravenous $Given 4/10/23 1155)   iopamidol (ISOVUE-370) solution 50 mL (50 mLs Intravenous $Given 4/10/23 1158)   LORazepam (ATIVAN) injection 1 mg (1 mg Intravenous $Given 4/10/23 1607)   gadobutrol (GADAVIST) injection 8 mL (8 mLs Intravenous $Given 4/10/23 1659)       NEW PRESCRIPTIONS STARTED AT TODAY'S ER VISIT  Discharge Medication List as of 4/10/2023  6:02 PM             =================================================================    HPI    Patient information was obtained from: patient    Use of : N/A         Joseph Ochoa is a 53 year old male with a pertinent history of SALO, mixed hyperlipidemia, essential hypertension, hypokalemia, hyponatremia, who presents to this ED via self walk-in for evaluation of aphasia.    Patient reports he woke up this morning at 5 AM (4/10/23) with sudden onset word finding difficulty. He said that he wasn't able to express what he wanted to express and had trouble finding the correct words. He says the symptoms were worse initially but has gradually gotten better now. He also associates change in vision which he describes as \"shakiness going back and forth.\" He denies associating dizziness (\"room spinning\").     He denies associating numbness, tingling, weakness in extremities and face, chest pain, shortness of breath, nausea, vomiting, diarrhea, and headache. He denies recent change in " medications. He endorses history of anxiety.    There were no other concerns/complaints at this time.      REVIEW OF SYSTEMS   Review of Systems   Eyes: Positive for visual disturbance (shakiness, back and forth).   Respiratory: Negative for shortness of breath.    Cardiovascular: Negative for chest pain.   Gastrointestinal: Negative for diarrhea, nausea and vomiting.   Neurological: Positive for speech difficulty (word finding difficulty). Negative for dizziness (room spinning), weakness, numbness and headaches.        Denies tingling in extremities.   All other systems reviewed and are negative.       PAST MEDICAL HISTORY:  History reviewed. No pertinent past medical history.    PAST SURGICAL HISTORY:  Past Surgical History:   Procedure Laterality Date     APPENDECTOMY             CURRENT MEDICATIONS:    atorvastatin (LIPITOR) 40 MG tablet  diazepam (VALIUM) 5 MG tablet  losartan (COZAAR) 50 MG tablet  omeprazole (PRILOSEC) 20 MG capsule  oxyCODONE-acetaminophen (PERCOCET) 5-325 MG tablet        ALLERGIES:  Allergies   Allergen Reactions     Ibuprofen Other (See Comments)     Kidney problems   IGA nephrothy     Lisinopril Other (See Comments)       FAMILY HISTORY:  Family History   Problem Relation Age of Onset     Depression Mother      Diabetes Mother      Substance Abuse Mother      Diabetes Maternal Grandmother      Hypertension Maternal Grandmother      Heart Disease Maternal Grandmother      Diabetes Maternal Grandfather      Hypertension Maternal Grandfather      Heart Disease Maternal Grandfather      Heart Disease Paternal Grandmother      Hypertension Paternal Grandmother        SOCIAL HISTORY:   Social History     Socioeconomic History     Marital status:      Spouse name: None     Number of children: None     Years of education: None     Highest education level: None   Tobacco Use     Smoking status: Never     Smokeless tobacco: Never   Substance and Sexual Activity     Alcohol use: Yes      "Comment: Alcoholic Drinks/day: occasional     Drug use: Yes     Types: Marijuana     Comment: Drug use: Daily marijuana use     Sexual activity: Yes     Partners: Female       VITALS:  BP (!) 147/99   Pulse 76   Temp 99.2  F (37.3  C) (Oral)   Resp 20   Ht 1.702 m (5' 7\")   Wt 83.9 kg (185 lb)   SpO2 92%   BMI 28.98 kg/m      PHYSICAL EXAM    General presentation: Alert, Vital signs reviewed. NAD  HENT: ENT inspection is normal. Oropharynx is moist and clear.   Eye: Pupils are equal and reactive to light. EOMI  Neck: The neck is supple, with full ROM, with no evidence of meningismus.  Pulmonary: Currently in no acute respiratory distress. Normal, non labored respirations, the lung sounds are normal with good equal air movement. Clear to auscultation bilaterally.   Circulatory: Regular rate and rhythm. Peripheral pulses are strong and equal. No murmurs, rubs, or gallops.   Abdominal: The abdomen is soft. Nontender. No rigidity, guarding, or rebound. Bowel sounds normal.   Neurologic: Alert, oriented to person, place, and time. No motor deficit. No sensory deficit. Cranial nerves II through XII are intact.  Musculoskeletal: No extremity tenderness. Full range of motion in all extremities. No extremity edema.   Skin: Skin color is normal. No rash. Warm. Dry to touch.   The patient has stroke symptoms:       Score    Level of consciousness: (0)   Alert, keenly responsive    LOC questions: (0)   Answers both questions correctly    LOC commands: (0)   Performs both tasks correctly    Best gaze: (0)   Normal    Visual: (0)   No visual loss    Facial palsy: (0)   Normal symmetrical movements    Motor arm (left): (0)   No drift    Motor arm (right): (0)   No drift    Motor leg (left): (0)   No drift    Motor leg (right): (0)   No drift    Limb ataxia: (0)   Absent    Sensory: (0)   Normal- no sensory loss    Best language: (0)   Normal- no aphasia    Dysarthria: (0)   Normal    Extinction and inattention: (0)   No " abnormality        Total Score:  0              LAB:  All pertinent labs reviewed and interpreted.  Results for orders placed or performed during the hospital encounter of 04/10/23   CTA Head Neck with Contrast    Impression    CONCLUSION:  HEAD CT:  1.  No intracranial hemorrhage, mass, or definite CT evidence of recent ischemia.    HEAD CTA:  1.  Mild carotid siphon atherosclerosis. No vessel stenosis, occlusion or aneurysm.    NECK CTA:  1.  Minor carotid artery atherosclerosis predominantly on the left. No dissection or hemodynamically significant narrowing in the neck by NASCET criteria.    Noncontrast head CT findings were discussed with Dr. Manjarrez via telephone at 1153 hours, and CTA results were discussed with Dr. Manjarrez via telephone at 1208 hours on 4/10/2023.     CT Head Perfusion w Contrast - For Tier 2 Stroke    Impression    IMPRESSION:   1.  Normal cerebral perfusion.   MR Brain w/o & w Contrast    Impression    IMPRESSION:  1.  Within the limitation of motion artifact, no acute intracranial finding.  2.  Mild presumed chronic microvascular ischemic change.   Basic metabolic panel   Result Value Ref Range    Sodium 137 136 - 145 mmol/L    Potassium 4.1 3.4 - 5.3 mmol/L    Chloride 99 98 - 107 mmol/L    Carbon Dioxide (CO2) 26 22 - 29 mmol/L    Anion Gap 12 7 - 15 mmol/L    Urea Nitrogen 18.0 6.0 - 20.0 mg/dL    Creatinine 0.96 0.67 - 1.17 mg/dL    Calcium 10.2 (H) 8.6 - 10.0 mg/dL    Glucose 106 (H) 70 - 99 mg/dL    GFR Estimate >90 >60 mL/min/1.73m2   Result Value Ref Range    INR 0.92 0.85 - 1.15   Partial thromboplastin time   Result Value Ref Range    aPTT 26 22 - 38 Seconds   Result Value Ref Range    Troponin T, High Sensitivity 8 <=22 ng/L   CBC with platelets and differential   Result Value Ref Range    WBC Count 7.5 4.0 - 11.0 10e3/uL    RBC Count 5.65 4.40 - 5.90 10e6/uL    Hemoglobin 17.5 13.3 - 17.7 g/dL    Hematocrit 51.8 40.0 - 53.0 %    MCV 92 78 - 100 fL    MCH 31.0 26.5 - 33.0 pg    MCHC  33.8 31.5 - 36.5 g/dL    RDW 12.8 10.0 - 15.0 %    Platelet Count 205 150 - 450 10e3/uL    % Neutrophils 61 %    % Lymphocytes 26 %    % Monocytes 10 %    % Eosinophils 2 %    % Basophils 1 %    % Immature Granulocytes 0 %    NRBCs per 100 WBC 0 <1 /100    Absolute Neutrophils 4.6 1.6 - 8.3 10e3/uL    Absolute Lymphocytes 1.9 0.8 - 5.3 10e3/uL    Absolute Monocytes 0.8 0.0 - 1.3 10e3/uL    Absolute Eosinophils 0.1 0.0 - 0.7 10e3/uL    Absolute Basophils 0.1 0.0 - 0.2 10e3/uL    Absolute Immature Granulocytes 0.0 <=0.4 10e3/uL    Absolute NRBCs 0.0 10e3/uL   Glucose by meter   Result Value Ref Range    GLUCOSE BY METER POCT 121 (H) 70 - 99 mg/dL       RADIOLOGY:  Reviewed all pertinent imaging. Please see official radiology report.  MR Brain w/o & w Contrast   Final Result   IMPRESSION:   1.  Within the limitation of motion artifact, no acute intracranial finding.   2.  Mild presumed chronic microvascular ischemic change.      CT Head Perfusion w Contrast - For Tier 2 Stroke   Final Result   IMPRESSION:    1.  Normal cerebral perfusion.      CTA Head Neck with Contrast   Final Result   CONCLUSION:   HEAD CT:   1.  No intracranial hemorrhage, mass, or definite CT evidence of recent ischemia.      HEAD CTA:   1.  Mild carotid siphon atherosclerosis. No vessel stenosis, occlusion or aneurysm.      NECK CTA:   1.  Minor carotid artery atherosclerosis predominantly on the left. No dissection or hemodynamically significant narrowing in the neck by NASCET criteria.      Noncontrast head CT findings were discussed with Dr. Manjarrez via telephone at 1153 hours, and CTA results were discussed with Dr. Manjarrez via telephone at 1208 hours on 4/10/2023.             EKG:    Normal sinus rhythm.  Rate of 67.  Normal QRS.  Normal QT.  No ST or T wave changes.  Compared to EKG on 9/15/2018 no significant changes are noted.    I have independently reviewed and interpreted the EKG(s) documented above.        Shea GARCIA , am serving as a scribe  to document services personally performed by Falguni Manjarrez DO based on my observation and the provider's statements to me. I, Falguni Manjarrez, attest that Shea Patel is acting in a scribe capacity, has observed my performance of the services and has documented them in accordance with my direction.    Falguni Manjarrez DO  Emergency Medicine  [facility] EMERGENCY DEPARTMENT  [address] [place] [id number] [phone]     Falguni Manjarrez DO  04/10/23 [id number]

## 2023-04-10 NOTE — CONSULTS
"  [facility]    Stroke Telephone Note    I was called by Falguni Manjarrez on 04/10/23 regarding patient Joseph Ochoa. The patient is a 53 year old male with PMH of anxiety, panic disorder, HTN and HLD. He presents to the emergency department for evaluation of speech changes.  Last known well was bedtime last night (exact time unknown).  He then awoke around 0500 this morning at which time he noticed speech changes, described as slurred speech and difficulty finding the right words.  He also endorses feeling generally \"shaky.\"  Per ED provider on examination speech appears grossly normal no other neurological findings or deficits are appreciated; patient is reported to appear extremely anxious.  Presenting blood pressure 173/115.    Stroke Code Data (for stroke code without tele)  Stroke code activated 04/10/23   1145   Stroke provider first response  04/10/23   1150            Last known normal 04/09/23    (bedtime; exact time unknown)        Time of discovery   (or onset of symptoms) 04/10/23   0500   Head CT read by Stroke Neuro Dr/Provider 04/10/23   1158   Was stroke code de-escalated? Yes 04/10/23 1210          Imaging Findings   CT head: no hemorrhage or other acute findings  CTA head/neck: No LVO, significant stenosis or dissection   CTP: normal perfusion    Intravenous Thrombolysis  Not given due to:   - unclear or unfavorable risk-benefit profile for extended window thrombolysis beyond the conventional 4.5 hour time window    Endovascular Treatment  Not initiated due to absence of proximal vessel occlusion    Impression  Subjective speech changes. Etiology unclear, consider anxiety/panic related vs hypertensive encephalopathy vs less likely small stroke     Recommendations   -brain MRI with and without contrast; please page stroke neurology if infarct is identified for further recommendations    My recommendations are based on the information provided over the phone by Joseph GRIFFITHS " "Don's in-person providers. They are not intended to replace the clinical judgment of his in-person providers. I was not requested to personally see or examine the patient at this time.    Ann SONI, CNP  Vascular Neurology  To page me or covering stroke neurology team member, click here: AMCOM   Choose \"On Call\" tab at top, then search dropdown box for \"Neurology Adult\", select location, press Enter, then look for stroke/neuro ICU/telestroke.    "

## 2023-04-11 LAB
ATRIAL RATE - MUSE: 67 BPM
DIASTOLIC BLOOD PRESSURE - MUSE: NORMAL MMHG
INTERPRETATION ECG - MUSE: NORMAL
P AXIS - MUSE: -18 DEGREES
PR INTERVAL - MUSE: 152 MS
QRS DURATION - MUSE: 88 MS
QT - MUSE: 394 MS
QTC - MUSE: 416 MS
R AXIS - MUSE: 18 DEGREES
SYSTOLIC BLOOD PRESSURE - MUSE: NORMAL MMHG
T AXIS - MUSE: 30 DEGREES
VENTRICULAR RATE- MUSE: 67 BPM

## 2023-10-21 ENCOUNTER — HEALTH MAINTENANCE LETTER (OUTPATIENT)
Age: 53
End: 2023-10-21

## 2024-12-14 ENCOUNTER — HEALTH MAINTENANCE LETTER (OUTPATIENT)
Age: 54
End: 2024-12-14

## 2025-05-07 ENCOUNTER — APPOINTMENT (OUTPATIENT)
Dept: CT IMAGING | Facility: HOSPITAL | Age: 55
DRG: 066 | End: 2025-05-07
Attending: EMERGENCY MEDICINE
Payer: COMMERCIAL

## 2025-05-07 ENCOUNTER — APPOINTMENT (OUTPATIENT)
Dept: MRI IMAGING | Facility: HOSPITAL | Age: 55
DRG: 066 | End: 2025-05-07
Attending: EMERGENCY MEDICINE
Payer: COMMERCIAL

## 2025-05-07 ENCOUNTER — HOSPITAL ENCOUNTER (INPATIENT)
Facility: HOSPITAL | Age: 55
End: 2025-05-07
Attending: EMERGENCY MEDICINE
Payer: COMMERCIAL

## 2025-05-07 DIAGNOSIS — G45.9 TIA (TRANSIENT ISCHEMIC ATTACK): ICD-10-CM

## 2025-05-07 DIAGNOSIS — R20.2 PARESTHESIA: ICD-10-CM

## 2025-05-07 DIAGNOSIS — R29.810 FACIAL DROOP: ICD-10-CM

## 2025-05-07 DIAGNOSIS — K29.70 GASTRITIS WITHOUT BLEEDING, UNSPECIFIED CHRONICITY, UNSPECIFIED GASTRITIS TYPE: ICD-10-CM

## 2025-05-07 DIAGNOSIS — I63.9 ISCHEMIC STROKE (H): Primary | ICD-10-CM

## 2025-05-07 LAB
ANION GAP SERPL CALCULATED.3IONS-SCNC: 14 MMOL/L (ref 7–15)
APTT PPP: 26 SECONDS (ref 22–38)
BASOPHILS # BLD AUTO: 0 10E3/UL (ref 0–0.2)
BASOPHILS NFR BLD AUTO: 1 %
BUN SERPL-MCNC: 18.1 MG/DL (ref 6–20)
CALCIUM SERPL-MCNC: 10.2 MG/DL (ref 8.8–10.4)
CHLORIDE SERPL-SCNC: 102 MMOL/L (ref 98–107)
CHOLEST SERPL-MCNC: 222 MG/DL
CREAT SERPL-MCNC: 0.89 MG/DL (ref 0.67–1.17)
EGFRCR SERPLBLD CKD-EPI 2021: >90 ML/MIN/1.73M2
EOSINOPHIL # BLD AUTO: 0.1 10E3/UL (ref 0–0.7)
EOSINOPHIL NFR BLD AUTO: 2 %
ERYTHROCYTE [DISTWIDTH] IN BLOOD BY AUTOMATED COUNT: 13 % (ref 10–15)
EST. AVERAGE GLUCOSE BLD GHB EST-MCNC: 117 MG/DL
GLUCOSE BLDC GLUCOMTR-MCNC: 121 MG/DL (ref 70–99)
GLUCOSE BLDC GLUCOMTR-MCNC: 98 MG/DL (ref 70–99)
GLUCOSE SERPL-MCNC: 88 MG/DL (ref 70–99)
HBA1C MFR BLD: 5.7 %
HCO3 SERPL-SCNC: 23 MMOL/L (ref 22–29)
HCT VFR BLD AUTO: 48.6 % (ref 40–53)
HDLC SERPL-MCNC: 62 MG/DL
HGB BLD-MCNC: 16.9 G/DL (ref 13.3–17.7)
HOLD SPECIMEN: NORMAL
IMM GRANULOCYTES # BLD: 0 10E3/UL
IMM GRANULOCYTES NFR BLD: 1 %
INR PPP: 0.95 (ref 0.85–1.15)
LDLC SERPL CALC-MCNC: 127 MG/DL
LYMPHOCYTES # BLD AUTO: 2.2 10E3/UL (ref 0.8–5.3)
LYMPHOCYTES NFR BLD AUTO: 35 %
MCH RBC QN AUTO: 30.4 PG (ref 26.5–33)
MCHC RBC AUTO-ENTMCNC: 34.8 G/DL (ref 31.5–36.5)
MCV RBC AUTO: 87 FL (ref 78–100)
MONOCYTES # BLD AUTO: 0.6 10E3/UL (ref 0–1.3)
MONOCYTES NFR BLD AUTO: 9 %
NEUTROPHILS # BLD AUTO: 3.4 10E3/UL (ref 1.6–8.3)
NEUTROPHILS NFR BLD AUTO: 53 %
NONHDLC SERPL-MCNC: 160 MG/DL
NRBC # BLD AUTO: 0 10E3/UL
NRBC BLD AUTO-RTO: 0 /100
PLATELET # BLD AUTO: 202 10E3/UL (ref 150–450)
POTASSIUM SERPL-SCNC: 3.7 MMOL/L (ref 3.4–5.3)
PROTHROMBIN TIME: 12.9 SECONDS (ref 11.8–14.8)
RBC # BLD AUTO: 5.56 10E6/UL (ref 4.4–5.9)
SODIUM SERPL-SCNC: 139 MMOL/L (ref 135–145)
TRIGL SERPL-MCNC: 167 MG/DL
TROPONIN T SERPL HS-MCNC: 15 NG/L
TROPONIN T SERPL HS-MCNC: 7 NG/L
TROPONIN T SERPL HS-MCNC: <6 NG/L
WBC # BLD AUTO: 6.4 10E3/UL (ref 4–11)

## 2025-05-07 PROCEDURE — 85025 COMPLETE CBC W/AUTO DIFF WBC: CPT | Performed by: EMERGENCY MEDICINE

## 2025-05-07 PROCEDURE — A9585 GADOBUTROL INJECTION: HCPCS | Performed by: EMERGENCY MEDICINE

## 2025-05-07 PROCEDURE — 250N000013 HC RX MED GY IP 250 OP 250 PS 637: Performed by: EMERGENCY MEDICINE

## 2025-05-07 PROCEDURE — 258N000003 HC RX IP 258 OP 636: Performed by: STUDENT IN AN ORGANIZED HEALTH CARE EDUCATION/TRAINING PROGRAM

## 2025-05-07 PROCEDURE — 82550 ASSAY OF CK (CPK): CPT | Performed by: INTERNAL MEDICINE

## 2025-05-07 PROCEDURE — G0508 CRIT CARE TELEHEA CONSULT 60: HCPCS | Mod: G0 | Performed by: PHYSICIAN ASSISTANT

## 2025-05-07 PROCEDURE — 82962 GLUCOSE BLOOD TEST: CPT

## 2025-05-07 PROCEDURE — 36415 COLL VENOUS BLD VENIPUNCTURE: CPT

## 2025-05-07 PROCEDURE — 99223 1ST HOSP IP/OBS HIGH 75: CPT | Performed by: STUDENT IN AN ORGANIZED HEALTH CARE EDUCATION/TRAINING PROGRAM

## 2025-05-07 PROCEDURE — 96374 THER/PROPH/DIAG INJ IV PUSH: CPT

## 2025-05-07 PROCEDURE — 80048 BASIC METABOLIC PNL TOTAL CA: CPT | Performed by: EMERGENCY MEDICINE

## 2025-05-07 PROCEDURE — 250N000011 HC RX IP 250 OP 636: Performed by: EMERGENCY MEDICINE

## 2025-05-07 PROCEDURE — 99418 PROLNG IP/OBS E/M EA 15 MIN: CPT | Performed by: STUDENT IN AN ORGANIZED HEALTH CARE EDUCATION/TRAINING PROGRAM

## 2025-05-07 PROCEDURE — 250N000013 HC RX MED GY IP 250 OP 250 PS 637: Performed by: STUDENT IN AN ORGANIZED HEALTH CARE EDUCATION/TRAINING PROGRAM

## 2025-05-07 PROCEDURE — 80061 LIPID PANEL: CPT | Performed by: STUDENT IN AN ORGANIZED HEALTH CARE EDUCATION/TRAINING PROGRAM

## 2025-05-07 PROCEDURE — 85610 PROTHROMBIN TIME: CPT | Performed by: EMERGENCY MEDICINE

## 2025-05-07 PROCEDURE — 99291 CRITICAL CARE FIRST HOUR: CPT | Mod: 25

## 2025-05-07 PROCEDURE — 84484 ASSAY OF TROPONIN QUANT: CPT

## 2025-05-07 PROCEDURE — 36415 COLL VENOUS BLD VENIPUNCTURE: CPT | Performed by: EMERGENCY MEDICINE

## 2025-05-07 PROCEDURE — 85730 THROMBOPLASTIN TIME PARTIAL: CPT | Performed by: EMERGENCY MEDICINE

## 2025-05-07 PROCEDURE — 250N000009 HC RX 250: Performed by: EMERGENCY MEDICINE

## 2025-05-07 PROCEDURE — 99207 PR APP CREDIT; MD BILLING SHARED VISIT: CPT | Mod: FS

## 2025-05-07 PROCEDURE — 70553 MRI BRAIN STEM W/O & W/DYE: CPT

## 2025-05-07 PROCEDURE — 120N000001 HC R&B MED SURG/OB

## 2025-05-07 PROCEDURE — 250N000013 HC RX MED GY IP 250 OP 250 PS 637

## 2025-05-07 PROCEDURE — 255N000002 HC RX 255 OP 636: Performed by: EMERGENCY MEDICINE

## 2025-05-07 PROCEDURE — 93005 ELECTROCARDIOGRAM TRACING: CPT | Performed by: EMERGENCY MEDICINE

## 2025-05-07 PROCEDURE — 70496 CT ANGIOGRAPHY HEAD: CPT

## 2025-05-07 PROCEDURE — 83036 HEMOGLOBIN GLYCOSYLATED A1C: CPT | Performed by: STUDENT IN AN ORGANIZED HEALTH CARE EDUCATION/TRAINING PROGRAM

## 2025-05-07 PROCEDURE — 84484 ASSAY OF TROPONIN QUANT: CPT | Performed by: EMERGENCY MEDICINE

## 2025-05-07 RX ORDER — SODIUM CHLORIDE 9 MG/ML
INJECTION, SOLUTION INTRAVENOUS CONTINUOUS
Status: DISCONTINUED | OUTPATIENT
Start: 2025-05-07 | End: 2025-05-08

## 2025-05-07 RX ORDER — ASPIRIN 325 MG
325 TABLET ORAL ONCE
Status: COMPLETED | OUTPATIENT
Start: 2025-05-07 | End: 2025-05-07

## 2025-05-07 RX ORDER — PANTOPRAZOLE SODIUM 40 MG/1
40 TABLET, DELAYED RELEASE ORAL
Status: DISPENSED | OUTPATIENT
Start: 2025-05-08

## 2025-05-07 RX ORDER — LORAZEPAM 2 MG/ML
1 INJECTION INTRAMUSCULAR ONCE
Status: DISCONTINUED | OUTPATIENT
Start: 2025-05-07 | End: 2025-05-07

## 2025-05-07 RX ORDER — ONDANSETRON 2 MG/ML
4 INJECTION INTRAMUSCULAR; INTRAVENOUS EVERY 6 HOURS PRN
Status: ACTIVE | OUTPATIENT
Start: 2025-05-07

## 2025-05-07 RX ORDER — EZETIMIBE 10 MG/1
10 TABLET ORAL EVERY EVENING
Status: ON HOLD | COMMUNITY
Start: 2025-01-09

## 2025-05-07 RX ORDER — GADOBUTROL 604.72 MG/ML
9 INJECTION INTRAVENOUS ONCE
Status: COMPLETED | OUTPATIENT
Start: 2025-05-07 | End: 2025-05-07

## 2025-05-07 RX ORDER — ATORVASTATIN CALCIUM 40 MG/1
40 TABLET, FILM COATED ORAL
Status: DISPENSED | OUTPATIENT
Start: 2025-05-07

## 2025-05-07 RX ORDER — ASPIRIN 81 MG/1
81 TABLET ORAL DAILY
Status: DISPENSED | OUTPATIENT
Start: 2025-05-08

## 2025-05-07 RX ORDER — ACETAMINOPHEN 325 MG/10.15ML
650 LIQUID ORAL EVERY 4 HOURS PRN
Status: ACTIVE | OUTPATIENT
Start: 2025-05-07

## 2025-05-07 RX ORDER — IOPAMIDOL 755 MG/ML
67 INJECTION, SOLUTION INTRAVASCULAR ONCE
Status: COMPLETED | OUTPATIENT
Start: 2025-05-07 | End: 2025-05-07

## 2025-05-07 RX ORDER — EZETIMIBE 10 MG/1
10 TABLET ORAL EVERY EVENING
Status: DISPENSED | OUTPATIENT
Start: 2025-05-07

## 2025-05-07 RX ORDER — OMEPRAZOLE 20 MG/1
20 CAPSULE, DELAYED RELEASE ORAL EVERY MORNING
Status: DISCONTINUED | OUTPATIENT
Start: 2025-05-08 | End: 2025-05-07

## 2025-05-07 RX ORDER — CLOPIDOGREL BISULFATE 75 MG/1
300 TABLET ORAL ONCE
Status: COMPLETED | OUTPATIENT
Start: 2025-05-07 | End: 2025-05-07

## 2025-05-07 RX ORDER — CLOPIDOGREL BISULFATE 75 MG/1
75 TABLET ORAL DAILY
Status: DISPENSED | OUTPATIENT
Start: 2025-05-08

## 2025-05-07 RX ORDER — LOSARTAN POTASSIUM 50 MG/1
50 TABLET ORAL EVERY MORNING
Status: ACTIVE | OUTPATIENT
Start: 2025-05-08

## 2025-05-07 RX ORDER — ACETAMINOPHEN 325 MG/1
650 TABLET ORAL EVERY 4 HOURS PRN
Status: ACTIVE | OUTPATIENT
Start: 2025-05-07

## 2025-05-07 RX ORDER — LORAZEPAM 0.5 MG/1
0.5 TABLET ORAL ONCE
Status: COMPLETED | OUTPATIENT
Start: 2025-05-07 | End: 2025-05-07

## 2025-05-07 RX ORDER — DIAZEPAM 5 MG/1
5 TABLET ORAL ONCE
Status: COMPLETED | OUTPATIENT
Start: 2025-05-07 | End: 2025-05-07

## 2025-05-07 RX ORDER — LORAZEPAM 2 MG/ML
1 INJECTION INTRAMUSCULAR ONCE
Status: COMPLETED | OUTPATIENT
Start: 2025-05-07 | End: 2025-05-07

## 2025-05-07 RX ORDER — ACETAMINOPHEN 650 MG/1
650 SUPPOSITORY RECTAL EVERY 4 HOURS PRN
Status: ACTIVE | OUTPATIENT
Start: 2025-05-07

## 2025-05-07 RX ORDER — ONDANSETRON 4 MG/1
4 TABLET, ORALLY DISINTEGRATING ORAL EVERY 6 HOURS PRN
Status: ACTIVE | OUTPATIENT
Start: 2025-05-07

## 2025-05-07 RX ADMIN — GADOBUTROL 9 ML: 604.72 INJECTION INTRAVENOUS at 16:37

## 2025-05-07 RX ADMIN — IOPAMIDOL 67 ML: 755 INJECTION, SOLUTION INTRAVENOUS at 14:48

## 2025-05-07 RX ADMIN — SODIUM CHLORIDE: 0.9 INJECTION, SOLUTION INTRAVENOUS at 18:58

## 2025-05-07 RX ADMIN — CLOPIDOGREL 300 MG: 75 TABLET ORAL at 15:11

## 2025-05-07 RX ADMIN — ASPIRIN 325 MG ORAL TABLET 325 MG: 325 PILL ORAL at 15:11

## 2025-05-07 RX ADMIN — ATORVASTATIN CALCIUM 40 MG: 40 TABLET, FILM COATED ORAL at 20:48

## 2025-05-07 RX ADMIN — SODIUM CHLORIDE 100 ML: 9 INJECTION, SOLUTION INTRAVENOUS at 14:49

## 2025-05-07 RX ADMIN — LORAZEPAM 0.5 MG: 0.5 TABLET ORAL at 18:57

## 2025-05-07 RX ADMIN — LORAZEPAM 1 MG: 2 INJECTION INTRAMUSCULAR; INTRAVENOUS at 15:53

## 2025-05-07 ASSESSMENT — ACTIVITIES OF DAILY LIVING (ADL)
ADLS_ACUITY_SCORE: 41
ADLS_ACUITY_SCORE: 25
ADLS_ACUITY_SCORE: 41
ADLS_ACUITY_SCORE: 41

## 2025-05-07 NOTE — ED NOTES
Patient called nurse to room for return of numbness on right side of face and numbness in right thumb and index finger. No noted facial droop at this time. [facility] 1 for numbness on right side of face and right fingers. MD notified.

## 2025-05-07 NOTE — ED NOTES
..[facility] ED Handoff Report    ED Chief Complaint: Patient arrives by private car for evaluation of right sided facial numbness and right hand numbness that started about 1410 this afternoon. Speech is clear and BS is 98. Tier 1 called by Dr. Hickman.     ED Diagnosis:  (R29.810) Facial droop  Comment: Right upper lip, has resolved  Plan: Observation    (R20.2) Paresthesia  Comment: Resolved, was on right hand and right side of face  Plan: Observation    (G45.9) TIA (transient ischemic attack)  Comment: Anticoagulants given in ED  Plan: Monitor       PMH:  No past medical history on file.     Code Status:  No Order     Falls Risk: No Band: Not applicable    Current Living Situation/Residence: lives with a significant other     Elimination Status: Continent: Yes     Activity Level: SBA    Patients Preferred Language:  English     Needed: No    Vital Signs:  BP (!) 187/111   Pulse 78   Temp 98.6  F (37  C)   Resp 22   Wt 91.2 kg (201 lb)   SpO2 95%   BMI 31.48 kg/m       Cardiac Rhythm: NSR    Pain Score: 0/10    Is the Patient Confused:  No    Last Food or Drink: 05/07/25 at 1530, sips of water for dysphagia screen    Focused Assessment:  Neuro    Tests Performed: Done: Labs and Imaging    Treatments Provided:  Supportive    Family Dynamics/Concerns: No    Family Updated On Visitor Policy: Yes    Plan of Care Communicated to Family: Yes    Who Was Updated about Plan of Care: Spouse at bedside    Belongings Checklist Done and Signed by Patient: N/A    Medications sent with patient: None to send at this time    Covid: asymptomatic , Not tested    Additional Information: N/A    Smitha Eugene RN 5/7/2025 5:28 PM

## 2025-05-07 NOTE — CONSULTS
"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 On ED provider exam has flattening R nasolabial fold.    He was seen via telemedicine following CT.  Symptoms were rapidly improving he felt only a mild Novocain sensation to one of his right teeth.  Sensation is in his hand was nearly back to baseline, he was able to write a sentence clearly.  Patient agreed symptoms were nondisabling at this time.    BP: 199/113           Past Medical History     Stroke risk factors: HTN, HLD, hx b/l carpal tunnel syndrome s/p surgery    Preadmission antithrombotic regimen: not on PTA ASA 81 mg daily, takes PTA Atorvastatin 40 mg daily    Modified Silvia Score (Pre-morbid)  0 - No symptoms.                   Assessment and Plan       1.  Right face/first 3 digits of right hand numbness, nearly resolved, suspect transient ischemic attack (TIA) versus small stroke     Intravenous Thrombolysis  Not given due to:   - minor/isolated/quickly resolving symptoms     Endovascular Treatment  Not initiated due to absence of proximal vessel occlusion     Plan:  - Use orderset: \"Ischemic Stroke Routine Admission\" or \"Ischemic Stroke No Thrombolytics/No Thrombectomy ICU Admission\"  - Place Neurology IP Stroke Consult order for [facility] neurology  -goal SBP <220  x first 24 hours post-stroke  -Neuro checks and vitals every 4 hours  -TTE (with bubble study if 60 yrs or less)   -MRI brain w/wo contrast   - mg x 1 then 81 mg daily  -Plavix 300 mg x 1 then 75 mg daily x 21 days   -PT/OT/SPT, Dysphagia screen  -troponin x 3, Telemetry, EKG  -blood glucose monitoring, check Hgb A1c (goal <7%)  -PTA atorvastatin 40 mg daily, check Lipid panel (titrate to goal LDL 40-70), <40 " "increases risk of ICH  -Euthermia, euglycemia, eunatremia   -Stroke Education    -Discussed with vascular neurology attending, Dr. Vega    ___________________________________________________________________    Beulah Reyes PA-C  Vascular Neurology    To page me or covering stroke neurology team member, click here: [employer]  Choose \"On Call\" tab at top, then select \"NEUROLOGY/ALL SITES\" from middle drop-down box, press Enter, then look for \"stroke\" or \"telestroke\" for your site.  ___________________________________________________________________        Imaging/Labs   (personally reviewed)    HEAD CT:  1.  No evidence of intracranial hemorrhage, large territorial infarct, or other acute intracranial abnormality.     HEAD CTA:   1.  No high-grade stenosis or occlusion of the major arteries in the head.     NECK CTA:  1.  No high-grade stenosis or occlusion of the major arteries in the neck.         Physical Examination     BP: (!) 199/113   Pulse: 88   Resp: 18   Temp: 98.6  F (37  C)       SpO2: 94 %   O2 Device: None (Room air)   Weight: 91.2 kg (201 lb)    Wt Readings from Last 2 Encounters:   05/07/25 91.2 kg (201 lb)   04/10/23 83.9 kg (185 lb)       General Exam  General:  patient lying in bed without any acute distress    HEENT:  normocephalic/atraumatic  Pulmonary:  no respiratory distress    Neuro Exam  Mental Status:  alert, oriented x 3, follows commands, speech clear and fluent, repetition normal  Cranial Nerves:  visual fields intact (tested by nurse), EOMI with normal smooth pursuit, facial sensation intact and symmetric (tested by nurse), facial movements symmetric, hearing not formally tested but intact to conversation, no dysarthria, tongue protrusion midline  Motor:  no abnormal movements, able to move all limbs antigravity spontaneously with no signs of hemiparesis observed, no pronator drift able to write a full sentence clearly with his right hand  Reflexes:  unable to test " (telestroke)  Sensory: Sensation nearly completely resolved to the first 3 digits of right hand, otherwise light touch sensation intact to bilateral upper and lower extremities   Station/Gait:  unable to test due to telestroke        Stroke Scales       NIHSS  1a. Level of Consciousness 0-->Alert, keenly responsive   1b. LOC Questions     1c. LOC Commands 0-->Performs both tasks correctly   2.   Best Gaze 0-->Normal   3.   Visual     4.   Facial Palsy 0-->Normal symmetrical movements   5a. Motor Arm, Left 0-->No drift, limb holds 90 (or 45) degrees for full 10 secs   5b. Motor Arm, Right 0-->No drift, limb holds 90 (or 45) degrees for full 10 secs   6a. Motor Leg, Left 0-->No drift, leg holds 30 degree position for full 5 secs   6b. Motor Leg, right 0-->No drift, leg holds 30 degree position for full 5 secs   7.   Limb Ataxia     8.   Sensory 1-->Mild-to-moderate sensory loss, patient feels pinprick is less sharp or is dull on the affected side, or there is a loss of superficial pain with pinprick, but patient is aware of being touched   9.   Best Language 0-->No aphasia, normal   10. Dysarthria 0-->Normal   11. Extinction and Inattention      Total (not recorded)            Labs     CBC  Lab Results   Component Value Date    HGB 16.9 05/07/2025    HCT 48.6 05/07/2025    WBC 6.4 05/07/2025     05/07/2025       BMP  Lab Results   Component Value Date     05/07/2025    POTASSIUM 3.7 05/07/2025    CHLORIDE 102 05/07/2025    CO2 23 05/07/2025    BUN 18.1 05/07/2025    CR 0.89 05/07/2025    GLC 88 05/07/2025    VIKTORIA 10.2 05/07/2025       INR  INR   Date Value Ref Range Status   05/07/2025 0.95 0.85 - 1.15 Final   04/10/2023 0.92 0.85 - 1.15 Final       Data   Stroke Code Data  (for stroke code with tele)  Stroke code activated 05/07/25  1432   First stroke provider response 05/07/25  1435   Video start time 05/07/25  1449   Video end time 05/07/25  1457   Last known normal 05/07/25  1419   Time of discovery  (or onset of symptoms)  05/07/25  1420   Head CT read by Stroke Neuro Provider 05/07/25  1443   Was stroke code de-escalated? Yes  05/07/25  1503     Telestroke Service Details  Type of service telemedicine diagnostic assessment of acute neurological changes   Reason telemedicine is appropriate patient requires assessment with a specialist for diagnosis and treatment of neurological symptoms   Mode of transmission secure interactive audio and video communication per [employer]   Originating site (patient location) [facility]    Distant site (provider location) [facility]        Clinically Significant Risk Factors Present on Admission                   # Hypertension: Noted on problem list                        Time Spent on this Encounter   Billing: I personally examined and evaluated the patient today. At the time of my evaluation and management the patient was critical condition today due to stroke code. I personally managed review of chart, medical record, meds, imaging, history, exam, and discussion with attending regarding plan and documentation. spent a total of 60 minutes providing critical care services, evaluating the patient, directing care and reviewing laboratory values and radiologic reports.     *All or a portion of this note was generated using voice recognition software and may contain transcription errors.

## 2025-05-07 NOTE — MEDICATION SCRIBE - ADMISSION MEDICATION HISTORY
Medication Scribe Admission Medication History    Admission medication history is complete. The information provided in this note is only as accurate as the sources available at the time of the update.    Information Source(s): Patient via in-person    Pertinent Information: Patient reports self management of medications.     Patient reports no longer taking Lipitor, Valium, Percocet    Changes made to PTA medication list:  Added: Gluc/Chond, Zetia, Multivitamin   Deleted: Lipitor, Valium, Percocet  Changed: None    Allergies reviewed with patient and updates made in EHR: yes    Medication History Completed By: Robi Langston 5/7/2025 3:13 PM    PTA Med List   Medication Sig Last Dose/Taking    ezetimibe (ZETIA) 10 MG tablet Take 10 mg by mouth every evening. 5/6/2025 Evening    GLUCOSAMINE-CHONDROITIN PO Take 1 tablet by mouth every evening. 5/6/2025 Evening    losartan (COZAAR) 50 MG tablet Take 50 mg by mouth every morning. 5/7/2025 Morning    Multiple Vitamins-Minerals (MENS 50+ MULTIVITAMIN PO) Take 1 tablet by mouth every morning. 5/7/2025 Morning    omeprazole (PRILOSEC) 20 MG capsule Take 20 mg by mouth every morning. 5/7/2025 Morning

## 2025-05-07 NOTE — H&P
[facility]    History and Physical - Hospitalist Service       Date of Admission:  5/7/2025    Assessment & Plan      Joseph Ochoa is a 55 year old male admitted on 5/7/2025. PMHx of HTN and HLD. He presented to ED for evaluation of sudden onset right sided facial numbness and right hand numbness that occurred at 1420 today while at work.  Patient describes paresthesias initially from the right cheekbone to the right chin and spanning to right side of nose without crossing midline. Paresthesias also noted to the right 1st, 2nd and 3rd digit.  Stroke neurology called, initiating TIA workup and orders.  General neurology consulted.    ADDENDUM: Hospital team paged at 1800 today for reoccurrence of symptoms.  Patient reported paresthesias more prominent this time, ranging from right eye to right chin across to his right nose without crossing midline.  Now noticing all digits on right hand including palmar aspect are numb.  Stroke neurology reconsult - recommending MRI cervical spine and repeat MRI brain STAT. Symptoms could be caused by elevated blood pressure, anxiety, or stroke/TIA. Treatment to continue as below pending repeat imaging.       Transient Ischemic Attack  -- Tier 1 Stroke Code called at 1440  -- CTA head/neck 5/7: No evidence of intracranial hemorrhage, large territorial infarct, or other acute intracranial abnormality.  No high-grade stenosis or occlusion of major arteries in the head or neck.  -- MRI brain 5/7: No acute intracranial process.  Mild chronic microvascular ischemic changes.  -- Labs unremarkable  -- Stroke Neurology recommendations:    - Goal SBP < 220 - HOLD PTA losartan    - Neurochecks and vitals every 4 hours   -  mg x 1 then 81 mg daily   - Plavix 300 mg x 1 then 75 mg daily x 21 days   - PT/OT/SPT, dysphagia screen   - Troponin x 3 - initial trop <6  - Cardiac telemetry, EKG   - Hemoglobin A1c pending    - Lipid panel pending   -- TTE with  bubble study ordered  -- General Neurology consult  -- Consider resumption of Lipitor, patient stopped taking in 2020 secondary to arthralgia  -- Continue PTA Zetia    Hypertension  -- Patient hypertensive on presentation, given above concern for TIA, will hold PTA losartan for permissive blood pressure x 24 hours  -- Continue to monitor    Hyperlipidemia  As above, patient stopped taking Lipitor in 2020 secondary to arthralgia.  Was started on Zetia shortly after secondary to abnormal lipid panel  -- Lipid panel throughout the last 5 years since stopping Lipitor have been mostly outside of normal range.  -- Lipid panel pending             Diet: NPO for Medical/Clinical Reasons Except for: Other; Specify: Until swallow evaluation has been done (no oral medications).    DVT Prophylaxis: Pneumatic Compression Devices, and Ambulate every shift  Irizarry Catheter: Not present  Lines: None     Cardiac Monitoring: ACTIVE order. Indication: Stroke, acute (48 hours)  Code Status: Full Code    Clinically Significant Risk Factors Present on Admission                   # Hypertension: Noted on problem list                      Disposition Plan     Medically Ready for Discharge: Anticipated in 2-4 Days      The patient's care was discussed with the Attending Physician, Dr. Gondal who independently met with and assessed the patient and is agreement with the assessment and plan.  Additionally, care discussed with patient.      Siri Loera PA-C  Hospitalist Service  [facility]  Securely message with Agari (more info)  Text page via [employer] Paging/Directory     ______________________________________________________________________    Chief Complaint   Numbness    History is obtained from the patient    History of Present Illness   Joseph Ochoa is a 55 year old male who presents to ED for evaluation of sudden onset right sided facial numbness and right hand numbness that occurred at 2:20 PM today.   Patient then presented immediately to the emergency department.  Patient describes subjective paresthesias initially from the right cheekbone to the right chin.  Indicating spanning across half of the nose.  All symptoms stopped midline.  Paresthesias noted to the right 1st, 2nd and 3rd digit.  Denied any symptoms in his arm or forearm.  No other symptoms noted.    On evaluation from hospital team, patient reports reonset of symptoms at 5:43 PM and have continued to worsen in that time.  Patient now noticing numbness of his right face from eye folds, right temporal ridge down to the chin and across the right side of the nose.  Again all symptoms stopping midline.  Slight facial droop noted to the right mouth fold.  Numbness now noticed in all 5 digits of right hand.  Again no forearm or arm symptoms.  No lower extremity numbness or tingling.  Patient denies shortness of breath or chest pain.  No vision changes, headache, or dizziness.  Denies ambulation issues.  Patient is quite anxious during interview.      Past Medical History    No past medical history on file.    Past Surgical History   Past Surgical History:   Procedure Laterality Date    APPENDECTOMY         Prior to Admission Medications   Prior to Admission Medications   Prescriptions Last Dose Informant Patient Reported? Taking?   GLUCOSAMINE-CHONDROITIN PO 5/6/2025 Evening  Yes Yes   Sig: Take 1 tablet by mouth every evening.   Multiple Vitamins-Minerals (MENS 50+ MULTIVITAMIN PO) 5/7/2025 Morning  Yes Yes   Sig: Take 1 tablet by mouth every morning.   ezetimibe (ZETIA) 10 MG tablet 5/6/2025 Evening  Yes Yes   Sig: Take 10 mg by mouth every evening.   losartan (COZAAR) 50 MG tablet 5/7/2025 Morning  Yes Yes   Sig: Take 50 mg by mouth every morning.   omeprazole (PRILOSEC) 20 MG capsule 5/7/2025 Morning  Yes Yes   Sig: Take 20 mg by mouth every morning.      Facility-Administered Medications: None        Review of Systems    The 10 point Review of Systems  is negative other than noted in the HPI or here.     Social History   I have reviewed this patient's social history and updated it with pertinent information if needed.  Social History     Tobacco Use    Smoking status: Never    Smokeless tobacco: Never   Substance Use Topics    Alcohol use: Yes     Comment: Alcoholic Drinks/day: occasional    Drug use: Yes     Types: Marijuana     Comment: Drug use: Daily marijuana use     Family History   I have reviewed this patient's family history and updated it with pertinent information if needed.  Family History   Problem Relation Age of Onset    Depression Mother     Diabetes Mother     Substance Abuse Mother     Diabetes Maternal Grandmother     Hypertension Maternal Grandmother     Heart Disease Maternal Grandmother     Diabetes Maternal Grandfather     Hypertension Maternal Grandfather     Heart Disease Maternal Grandfather     Heart Disease Paternal Grandmother     Hypertension Paternal Grandmother      Allergies   Allergies   Allergen Reactions    Ibuprofen Other (See Comments)     Kidney problems   IGA nephrothy    Lisinopril Other (See Comments)        Physical Exam   Vital Signs: Temp: 98.6  F (37  C)   BP: (!) 187/111 Pulse: 78   Resp: 22 SpO2: 95 % O2 Device: None (Room air)    Weight: 201 lbs 0 oz    Physical Exam  GENERAL: Well-developed, well-nourished older male laying in bed. Rather anxious.  EYES: Lids and lashes normal. Pupils equal. No conjunctivitis, injection or icterus.   HENT: Mucous membranes moist. Slight drooping near R-sided mouth crease.  RESPIRATORY: CTAB. Good inspiratory effort. No rhonchi, wheezes, or rales.   CARDIOVASCULAR: RRR, no murmurs gallops or rubs.   ABDOMEN: Bowel sounds present, soft and non-tender to palpation.   EXTREMITIES: No lower extremity edema. Moves extremities spontaneously.  Right neck ROM normal.  Strength against resistance 5/5 bilateral upper extremities.   strength 5/5 bilaterally.  NEURO: Alert and oriented x 3.   Paresthesias noted to right facial nerve, with drooping noted along buccal branch of facial nerve.  Paresthesias right hand and all digits.  No left-sided neurologic deficit.  SKIN: No rashes or lesions to exposed skin.   PSYCH: Anxious.  Pleasant and cooperative.      Medical Decision Making       70 MINUTES SPENT BY ME on the date of service doing chart review, history, exam, documentation & further activities per the note.      Data   ------------------------- PAST 24 HR DATA REVIEWED -----------------------------------------------    I have personally reviewed the following data over the past 24 hrs:    6.4  \   16.9   / 202     139 102 18.1 /  88   3.7 23 0.89 \     Trop: <6 BNP: N/A     INR:  0.95 PTT:  26   D-dimer:  N/A Fibrinogen:  N/A       Imaging results reviewed over the past 24 hrs:   Recent Results (from the past 24 hours)   CTA Head Neck with Contrast    Narrative    EXAM: CTA HEAD NECK W CONTRAST  LOCATION: [facility]  DATE: 5/7/2025    INDICATION: Code Stroke, evaluate for LVO. PLEASE READ IMMEDIATELY. Right facial droop and right hand numbness.  COMPARISON: None.  CONTRAST: isovue 370 67ml  TECHNIQUE: Head and neck CT angiogram with IV contrast. Noncontrast head CT followed by axial helical CT images of the head and neck vessels obtained during the arterial phase of intravenous contrast administration. Axial 2D reconstructed images and   multiplanar 3D MIP reconstructed images of the head and neck vessels were performed by the technologist. Dose reduction techniques were used. All stenosis measurements made according to NASCET criteria unless otherwise specified.    FINDINGS:   NONCONTRAST HEAD CT:   INTRACRANIAL CONTENTS: No intracranial hemorrhage, extraaxial collection, or mass effect.  No CT evidence of acute infarct. Normal parenchymal attenuation. Normal ventricles and sulci.     VISUALIZED ORBITS/SINUSES/MASTOIDS: No intraorbital abnormality. No paranasal sinus  mucosal disease. No middle ear or mastoid effusion.    BONES/SOFT TISSUES: No acute abnormality.    HEAD CTA:  ANTERIOR CIRCULATION: There is minimal atherosclerotic plaque cavernous ICA segments. No stenosis/occlusion, aneurysm, or high flow vascular malformation. Developmentally hypoplastic right A1 anterior cerebral artery segment. Fetal supply of the right   posterior cerebral artery.    POSTERIOR CIRCULATION: No stenosis/occlusion, aneurysm, or high flow vascular malformation. Balanced vertebral arteries supply a normal basilar artery.     DURAL VENOUS SINUSES: Expected enhancement of the major dural venous sinuses.    NECK CTA:  RIGHT CAROTID: No measurable stenosis or dissection.    LEFT CAROTID: There is minimal atherosclerotic plaque the left carotid bifurcation. No measurable stenosis or dissection.    VERTEBRAL ARTERIES: No focal stenosis or dissection. Balanced vertebral arteries.    AORTIC ARCH: Classic aortic arch anatomy with no significant stenosis at the origin of the great vessels.    NONVASCULAR STRUCTURES: There is moderate degenerative disc disease at C5-C6 with disc osteophyte complex resulting in moderate spinal canal stenosis.      Impression    IMPRESSION:   HEAD CT:  1.  No evidence of intracranial hemorrhage, large territorial infarct, or other acute intracranial abnormality.    HEAD CTA:   1.  No high-grade stenosis or occlusion of the major arteries in the head.    NECK CTA:  1.  No high-grade stenosis or occlusion of the major arteries in the neck.        Imaging findings discussed with Dr. Shi by Dr. Garcia on 5/7/2025 2:49 PM CDT   MR Brain w/o & w Contrast    Narrative    EXAM: MR BRAIN W/O and W CONTRAST  LOCATION: [facility]  DATE: 5/7/2025    INDICATION: Numbness R face R hand, R facial droop  COMPARISON: MRI 4/10/2023, CT 5/7/2025.  CONTRAST: 9 mL Gadavist  TECHNIQUE: Routine multiplanar multisequence head MRI without and with intravenous  contrast.    FINDINGS:  INTRACRANIAL CONTENTS: No acute or subacute infarct. No mass, acute hemorrhage, or extra-axial fluid collections. Scattered nonspecific T2/FLAIR hyperintensities within the cerebral white matter most consistent with mild chronic microvascular ischemic   change. Normal ventricles and sulci. Normal position of the cerebellar tonsils. No pathologic contrast enhancement.    SELLA: No abnormality accounting for technique.    OSSEOUS STRUCTURES/SOFT TISSUES: Normal marrow signal. The major intracranial vascular flow voids are maintained.     ORBITS: No abnormality accounting for technique.     SINUSES/MASTOIDS: No paranasal sinus mucosal disease. No middle ear or mastoid effusion.       Impression    IMPRESSION:  1.  No acute intracranial process.  2.  Mild chronic microvascular ischemic changes.

## 2025-05-07 NOTE — ED TRIAGE NOTES
Patient arrives by private car for evaluation of right sided facial numbness and right hand numbness that started about 1410 this afternoon.  Speech is clear and BS is 98.  Tier 1 called by Dr. Hickman.

## 2025-05-07 NOTE — ED PROVIDER NOTES
EMERGENCY DEPARTMENT ENCOUNTER      NAME: Joseph Ochoa  AGE: 55 year old male  YOB: 1970  MRN: [id number]  EVALUATION DATE & TIME: No admission date for patient encounter.    PCP: Osvaldo Telles    ED PROVIDER: Aruna Hickman MD    Chief Complaint   Patient presents with    Numbness         FINAL IMPRESSION:  1. Facial droop    2. Paresthesia    3. TIA (transient ischemic attack)          ED COURSE & MEDICAL DECISION MAKING:    Pertinent Labs & Imaging studies reviewed. (See chart for details)  55 year old male with history of HTN, HLD who presents to the Emergency Department for evaluation of sudden onset of right sided facial numbness and right hand numbness 10 to 15 minutes prior to arrival.  On examination patient is hypertensive with subjective paresthesias to the above locations and flattening of the right nasolabial fold.  Concern for CVA, TIA, less likely intracranial mass lesion, electrolyte abnormality.    Patient initially seen evaluate by myself in triage area due to boarding crisis.  Tier 1 stroke alert activated and patient was expedited for neuroimaging.  CT/CTA of the head and neck ultimately unremarkable.  Given rapid improvement of symptoms, not a candidate for thrombolysis at this time.  MRI shows sinus rhythm and laboratory workup benign.  Case discussed with stroke neurology who agrees with loading with aspirin, Plavix, MRI and admission for further evaluation.      ED Course as of 05/07/25 1704   Wed May 07, 2025   1438 Spoke w [place] stroke neuro   1450 CTA Head Neck with Contrast  Independently interpreted by myself without visualized ICH   1452 Spoke w neuro rad - ct, cta neg    1504 Spoke w [place], stroke neuro. Sx nearly resolved lingering numbness. Suspect small cva v tia. Asa/plavix,mri   1700 Per soc no neuro beds in system       Medical Decision Making  I obtained history from Family Member/Significant Other  I reviewed the EMR: Outpatient Record: 1/22/2025 clinic  visit  Admit.    MIPS (CTPE, Dental pain, Irizarry, Sinusitis, Asthma/COPD, Head Trauma): Not Applicable    SEPSIS: None          At the conclusion of the encounter I discussed the results of all of the tests and the disposition. The questions were answered. The patient or family acknowledged understanding and was agreeable with the care plan.    CRITICAL CARE:  Critical Care  Performed by: Aruna Hickman MD  Authorized by: Aruna Hickman MD     Total critical care time: 35 minutes  Criticalcare time was exclusive of separately billable procedures and treating other patients.  Critical care was necessary to treat or prevent imminent or life-threatening deterioration of the following conditions: Sideration for thrombolysis, death, disability  Critical care was time spent personally by me on the following activities: development of treatment plan with patient or surrogate, discussions with consultants, examination of patient, evaluation of patient's response totreatment, obtaining history from patient or surrogate, ordering and performing treatments and interventions, ordering and review of laboratory studies, ordering and review of radiographic studies and re-evaluation ofpatient's condition, this excludes any separately billable procedures.      MEDICATIONS GIVEN IN THE EMERGENCY:  Medications   diazepam (VALIUM) tablet 5 mg (has no administration in time range)   iopamidol (ISOVUE-370) solution 67 mL (67 mLs Intravenous $Given 5/7/25 1448)     And   sodium chloride 0.9 % bag for CT scan flush (100 mLs As instructed $Given 5/7/25 1449)   aspirin (ASA) tablet 325 mg (325 mg Oral $Given 5/7/25 1511)   clopidogrel (PLAVIX) tablet 300 mg (300 mg Oral $Given 5/7/25 1511)   LORazepam (ATIVAN) injection 1 mg (1 mg Intravenous $Given 5/7/25 1553)   gadobutrol (GADAVIST) injection 9 mL (9 mLs Intravenous $Given 5/7/25 1637)       NEW PRESCRIPTIONS STARTED AT TODAY'S ER VISIT  New Prescriptions    No medications on file       "    =================================================================    HPI    Patient information was obtained from: patient     Use of Intrepreter: N/A        Joseph Ochoa is a 55 year old male with pertinent medical history of HTN, HLD, and anxiety who presents for evaluation of numbness.     About 10-15 minutes prior to arrival, around 2:15 PM, the patient started experiencing right sided facial and right hand numbness. He notes that it felt as though he was given some novocain in his face. His facial numbness is on this right cheek and jaw, but not in his forehead. His hand numbness is only over his 1st, 2nd, and 3rd fingers. He was working with concrete prior to onset. He believes his speech is normal. He has some mild right facial flattening that he believes is not normal. He notes that he feels \"cloudy\" in his head. He has no history of stroke but his younger brother does. He does not smoke.     PAST MEDICAL HISTORY:  No past medical history on file.    PAST SURGICAL HISTORY:  Past Surgical History:   Procedure Laterality Date    APPENDECTOMY         CURRENT MEDICATIONS:    Prior to Admission Medications   Prescriptions Last Dose Informant Patient Reported? Taking?   GLUCOSAMINE-CHONDROITIN PO 5/6/2025 Evening  Yes Yes   Sig: Take 1 tablet by mouth every evening.   Multiple Vitamins-Minerals (MENS 50+ MULTIVITAMIN PO) 5/7/2025 Morning  Yes Yes   Sig: Take 1 tablet by mouth every morning.   ezetimibe (ZETIA) 10 MG tablet 5/6/2025 Evening  Yes Yes   Sig: Take 10 mg by mouth every evening.   losartan (COZAAR) 50 MG tablet 5/7/2025 Morning  Yes Yes   Sig: Take 50 mg by mouth every morning.   omeprazole (PRILOSEC) 20 MG capsule 5/7/2025 Morning  Yes Yes   Sig: Take 20 mg by mouth every morning.      Facility-Administered Medications: None       ALLERGIES:  Allergies   Allergen Reactions    Ibuprofen Other (See Comments)     Kidney problems   IGA nephrothy    Lisinopril Other (See Comments)       FAMILY " HISTORY:  Family History   Problem Relation Age of Onset    Depression Mother     Diabetes Mother     Substance Abuse Mother     Diabetes Maternal Grandmother     Hypertension Maternal Grandmother     Heart Disease Maternal Grandmother     Diabetes Maternal Grandfather     Hypertension Maternal Grandfather     Heart Disease Maternal Grandfather     Heart Disease Paternal Grandmother     Hypertension Paternal Grandmother        SOCIAL HISTORY:  Social History     Tobacco Use    Smoking status: Never    Smokeless tobacco: Never   Substance Use Topics    Alcohol use: Yes     Comment: Alcoholic Drinks/day: occasional    Drug use: Yes     Types: Marijuana     Comment: Drug use: Daily marijuana use        VITALS:  Patient Vitals for the past 24 hrs:   BP Temp Pulse Resp SpO2 Weight   05/07/25 1453 (!) 199/113 -- 88 18 94 % --   05/07/25 1430 (!) 173/128 98.6  F (37  C) 90 18 95 % 91.2 kg (201 lb)       PHYSICAL EXAM    General Appearance: Well-appearing, well-nourished, no acute distress   Head:  Normocephalic  Eyes:  PERRL, conjunctiva/corneas clear, EOM's intact  ENT: membranes are moist without pallor  Neck:  Supple  Cardio:  Regular rate and rhythm, no murmur/gallop/rub, hypertensive  Pulm:  No respiratory distress, clear to auscultation bilaterally  Extremities: Normal gait  Skin:  Skin warm, dry, no rashes  Neuro:  Alert and oriented ×3, moving all extremities, Sub paraesthesia to 1st, 2nd, and 3rd fingers in right hand and to right VII, VIII regions of face. Mild right nasolabial flattening.      [employer] Stroke Scale  Exam Interval: Baseline   Score    Level of consciousness: (0)   Alert, keenly responsive    LOC questions: (0)   Answers both questions correctly    LOC commands: (0)   Performs both tasks correctly    Best gaze: (0)   Normal    Visual: (0)   No visual loss    Facial palsy: (1)   Minor paralysis (flat nasolabial fold, smile asymmetry)    Motor arm (left): (0)   No drift    Motor  arm (right): (0)   No drift    Motor leg (left): (0)   No drift    Motor leg (right): (0)   No drift    Limb ataxia: (0)   Absent    Sensory: (1)   Mild to moderate sensory loss    Best language: (0)   Normal- no aphasia    Dysarthria: (0)   Normal    Extinction and inattention: (0)   No abnormality        Total Score:  2       RADIOLOGY/LABS:  Reviewed all pertinent imaging. Please see official radiology report. All pertinent labs reviewed and interpreted.    Results for orders placed or performed during the hospital encounter of 05/07/25   CTA Head Neck with Contrast    Impression    IMPRESSION:   HEAD CT:  1.  No evidence of intracranial hemorrhage, large territorial infarct, or other acute intracranial abnormality.    HEAD CTA:   1.  No high-grade stenosis or occlusion of the major arteries in the head.    NECK CTA:  1.  No high-grade stenosis or occlusion of the major arteries in the neck.        Imaging findings discussed with Dr. Shi by Dr. Garcia on 5/7/2025 2:49 PM CDT   Basic metabolic panel   Result Value Ref Range    Sodium 139 135 - 145 mmol/L    Potassium 3.7 3.4 - 5.3 mmol/L    Chloride 102 98 - 107 mmol/L    Carbon Dioxide (CO2) 23 22 - 29 mmol/L    Anion Gap 14 7 - 15 mmol/L    Urea Nitrogen 18.1 6.0 - 20.0 mg/dL    Creatinine 0.89 0.67 - 1.17 mg/dL    GFR Estimate >90 >60 mL/min/1.73m2    Calcium 10.2 8.8 - 10.4 mg/dL    Glucose 88 70 - 99 mg/dL   INR   Result Value Ref Range    INR 0.95 0.85 - 1.15    PT 12.9 11.8 - 14.8 Seconds   Partial thromboplastin time   Result Value Ref Range    aPTT 26 22 - 38 Seconds   Result Value Ref Range    Troponin T, High Sensitivity <6 <=22 ng/L   Glucose by meter   Result Value Ref Range    GLUCOSE BY METER POCT 98 70 - 99 mg/dL   CBC with platelets and differential   Result Value Ref Range    WBC Count 6.4 4.0 - 11.0 10e3/uL    RBC Count 5.56 4.40 - 5.90 10e6/uL    Hemoglobin 16.9 13.3 - 17.7 g/dL    Hematocrit 48.6 40.0 - 53.0 %    MCV 87 78 - 100 fL    MCH  30.4 26.5 - 33.0 pg    MCHC 34.8 31.5 - 36.5 g/dL    RDW 13.0 10.0 - 15.0 %    Platelet Count 202 150 - 450 10e3/uL    % Neutrophils 53 %    % Lymphocytes 35 %    % Monocytes 9 %    % Eosinophils 2 %    % Basophils 1 %    % Immature Granulocytes 1 %    NRBCs per 100 WBC 0 <1 /100    Absolute Neutrophils 3.4 1.6 - 8.3 10e3/uL    Absolute Lymphocytes 2.2 0.8 - 5.3 10e3/uL    Absolute Monocytes 0.6 0.0 - 1.3 10e3/uL    Absolute Eosinophils 0.1 0.0 - 0.7 10e3/uL    Absolute Basophils 0.0 0.0 - 0.2 10e3/uL    Absolute Immature Granulocytes 0.0 <=0.4 10e3/uL    Absolute NRBCs 0.0 10e3/uL   Extra Red Top Tube   Result Value Ref Range    Hold Specimen JIC        EKG:  Performed at: 15:00    Impression: Sinus rhythm with sinus arrhythmia. Septal infarct, age undetermined. Abnormal ECG.     Rate: 68  Rhythm: sinus  Axis: 38  IA Interval: 162  QRS Interval: 92  QTc Interval: 404  ST Changes: No ST elevations or depressions   Comparison: When compared with ECG of 10-Apr-2023, no significant change was found.    I have independently reviewed and interpreted the EKG(s) documented above.      The creation of this record is based on the scribe s observations of the work being performed by Aruna Hickman MD and the provider s statements to them. It was created on her behalf by Caroline Rivera, a trained medical scribe. This document has been checked and approved by the attending provider.    Aruna Hickman MD  Emergency Medicine  [facility] EMERGENCY DEPARTMENT  [facility] [facility] [id number] [phone]  Dept: [phone]     Aruna Hickman MD  05/07/25 3236

## 2025-05-08 ENCOUNTER — APPOINTMENT (OUTPATIENT)
Dept: MRI IMAGING | Facility: HOSPITAL | Age: 55
DRG: 066 | End: 2025-05-08
Payer: COMMERCIAL

## 2025-05-08 ENCOUNTER — APPOINTMENT (OUTPATIENT)
Dept: ULTRASOUND IMAGING | Facility: HOSPITAL | Age: 55
DRG: 066 | End: 2025-05-08
Attending: INTERNAL MEDICINE
Payer: COMMERCIAL

## 2025-05-08 ENCOUNTER — APPOINTMENT (OUTPATIENT)
Dept: CARDIOLOGY | Facility: HOSPITAL | Age: 55
DRG: 066 | End: 2025-05-08
Attending: STUDENT IN AN ORGANIZED HEALTH CARE EDUCATION/TRAINING PROGRAM
Payer: COMMERCIAL

## 2025-05-08 VITALS
SYSTOLIC BLOOD PRESSURE: 140 MMHG | OXYGEN SATURATION: 95 % | DIASTOLIC BLOOD PRESSURE: 78 MMHG | TEMPERATURE: 98 F | BODY MASS INDEX: 31.23 KG/M2 | RESPIRATION RATE: 18 BRPM | HEIGHT: 67 IN | WEIGHT: 199 LBS | HEART RATE: 83 BPM

## 2025-05-08 LAB
ALBUMIN SERPL BCG-MCNC: 4.2 G/DL (ref 3.5–5.2)
ANION GAP SERPL CALCULATED.3IONS-SCNC: 8 MMOL/L (ref 7–15)
BUN SERPL-MCNC: 14.5 MG/DL (ref 6–20)
CALCIUM SERPL-MCNC: 8.8 MG/DL (ref 8.8–10.4)
CHLORIDE SERPL-SCNC: 102 MMOL/L (ref 98–107)
CK SERPL-CCNC: 133 U/L (ref 39–308)
CREAT SERPL-MCNC: 0.81 MG/DL (ref 0.67–1.17)
EGFRCR SERPLBLD CKD-EPI 2021: >90 ML/MIN/1.73M2
GLUCOSE BLDC GLUCOMTR-MCNC: 107 MG/DL (ref 70–99)
GLUCOSE SERPL-MCNC: 103 MG/DL (ref 70–99)
HCO3 SERPL-SCNC: 29 MMOL/L (ref 22–29)
HOLD SPECIMEN: NORMAL
PHOSPHATE SERPL-MCNC: 3.3 MG/DL (ref 2.5–4.5)
POTASSIUM SERPL-SCNC: 4.2 MMOL/L (ref 3.4–5.3)
SODIUM SERPL-SCNC: 139 MMOL/L (ref 135–145)

## 2025-05-08 PROCEDURE — 999N000147 HC STATISTIC PT IP EVAL DEFER: Performed by: PHYSICAL THERAPIST

## 2025-05-08 PROCEDURE — 93306 TTE W/DOPPLER COMPLETE: CPT

## 2025-05-08 PROCEDURE — 70553 MRI BRAIN STEM W/O & W/DYE: CPT

## 2025-05-08 PROCEDURE — 99223 1ST HOSP IP/OBS HIGH 75: CPT | Performed by: PSYCHIATRY & NEUROLOGY

## 2025-05-08 PROCEDURE — 255N000002 HC RX 255 OP 636

## 2025-05-08 PROCEDURE — 93880 EXTRACRANIAL BILAT STUDY: CPT

## 2025-05-08 PROCEDURE — 250N000011 HC RX IP 250 OP 636: Performed by: INTERNAL MEDICINE

## 2025-05-08 PROCEDURE — 99233 SBSQ HOSP IP/OBS HIGH 50: CPT | Performed by: INTERNAL MEDICINE

## 2025-05-08 PROCEDURE — 999N000226 HC STATISTIC SLP IP EVAL DEFER

## 2025-05-08 PROCEDURE — 250N000013 HC RX MED GY IP 250 OP 250 PS 637: Performed by: INTERNAL MEDICINE

## 2025-05-08 PROCEDURE — 120N000001 HC R&B MED SURG/OB

## 2025-05-08 PROCEDURE — 36415 COLL VENOUS BLD VENIPUNCTURE: CPT | Performed by: INTERNAL MEDICINE

## 2025-05-08 PROCEDURE — 80069 RENAL FUNCTION PANEL: CPT | Performed by: INTERNAL MEDICINE

## 2025-05-08 PROCEDURE — 72156 MRI NECK SPINE W/O & W/DYE: CPT

## 2025-05-08 PROCEDURE — 250N000013 HC RX MED GY IP 250 OP 250 PS 637: Performed by: STUDENT IN AN ORGANIZED HEALTH CARE EDUCATION/TRAINING PROGRAM

## 2025-05-08 PROCEDURE — A9585 GADOBUTROL INJECTION: HCPCS

## 2025-05-08 PROCEDURE — 999N000111 HC STATISTIC OT IP EVAL DEFER

## 2025-05-08 RX ORDER — DIAZEPAM 5 MG/1
5 TABLET ORAL EVERY 6 HOURS PRN
Status: COMPLETED | OUTPATIENT
Start: 2025-05-08 | End: 2025-05-08

## 2025-05-08 RX ORDER — GADOBUTROL 604.72 MG/ML
9 INJECTION INTRAVENOUS ONCE
Status: COMPLETED | OUTPATIENT
Start: 2025-05-08 | End: 2025-05-08

## 2025-05-08 RX ORDER — LORAZEPAM 2 MG/ML
1 INJECTION INTRAMUSCULAR
Status: COMPLETED | OUTPATIENT
Start: 2025-05-08 | End: 2025-05-08

## 2025-05-08 RX ORDER — HYDRALAZINE HYDROCHLORIDE 20 MG/ML
10 INJECTION INTRAMUSCULAR; INTRAVENOUS EVERY 6 HOURS PRN
Status: ACTIVE | OUTPATIENT
Start: 2025-05-08

## 2025-05-08 RX ADMIN — ATORVASTATIN CALCIUM 40 MG: 40 TABLET, FILM COATED ORAL at 20:28

## 2025-05-08 RX ADMIN — EZETIMIBE 10 MG: 10 TABLET ORAL at 20:27

## 2025-05-08 RX ADMIN — DIAZEPAM 5 MG: 5 TABLET ORAL at 22:07

## 2025-05-08 RX ADMIN — GADOBUTROL 9 ML: 604.72 INJECTION INTRAVENOUS at 13:01

## 2025-05-08 RX ADMIN — CLOPIDOGREL 75 MG: 75 TABLET ORAL at 08:31

## 2025-05-08 RX ADMIN — ASPIRIN 81 MG: 81 TABLET, COATED ORAL at 08:31

## 2025-05-08 RX ADMIN — LORAZEPAM 1 MG: 2 INJECTION INTRAMUSCULAR; INTRAVENOUS at 11:51

## 2025-05-08 ASSESSMENT — ACTIVITIES OF DAILY LIVING (ADL)
ADLS_ACUITY_SCORE: 25

## 2025-05-08 NOTE — PROGRESS NOTES
"[facility]    Medicine Progress Note - Hospitalist Service    Date of Admission:  5/7/2025    Assessment & Plan      55 year old male admitted on 5/7/2025. PMHx of HTN and HLD. He presented to ED for evaluation of sudden onset right sided facial numbness and right hand numbness that occurred at 1420 today while at work.  Patient describes paresthesias initially from the right cheekbone to the right chin and spanning to right side of nose without crossing midline. Paresthesias also noted to the right 1st, 2nd and 3rd digit.     1.TIA vs CVA  -intermit right face nunmbness and right hand  -mri last pm neg for cva  -agree repeat mri today  -although numbness better, there still  -pt/ot eval  -neurology eval  -plavix + asa x 3 weeks, then asa  -check echo    2.Hx of htn  -he took med from home bag this am, instructed him not to take anymore and allow us to admin, due to fact we are allowing bp run higher now with concern cva    3.Lipids  -stopped Lipitor with arthralgias  -on Zetia now  -neuro added back statin, will check ck    4.Smokes marijuana    -Addendum  -mri today confirms cva  -get echo  -get carotid ultrasound    --I updated wife in room at 1645            Diet: Regular Diet Adult    DVT Prophylaxis: Pneumatic Compression Devices  Irizarry Catheter: Not present  Lines: None     Cardiac Monitoring: ACTIVE order. Indication: Stroke, acute (48 hours)  Code Status: Full Code      Clinically Significant Risk Factors Present on Admission                   # Hypertension: Noted on problem list           # Obesity: Estimated body mass index is 31.17 kg/m  as calculated from the following:    Height as of this encounter: 1.702 m (5' 7\").    Weight as of this encounter: 90.3 kg (199 lb).              Social Drivers of Health    Depression: At risk (1/18/2024)    [facility]    PHQ-2     PHQ-2 TOTAL SCORE: 4          Disposition Plan     Medically Ready " for Discharge: Anticipated in 2-4 Days             Do West MD  Hospitalist Service  [facility]  Securely message with [employer] (more info)  Text page via [employer] Paging/Directory   ______________________________________________________________________    Interval History   He notes this am numbness in less area, half right top above lip and right thumb and index finger tips, rest improved  No motor weakness, no leg symptoms  No issues eating  Did take some meds out of bag this am    Physical Exam   Vital Signs: Temp: 97.8  F (36.6  C) Temp src: Oral BP: (!) 184/104 Pulse: 72   Resp: 18 SpO2: 94 % O2 Device: None (Room air)    Weight: 199 lbs 0 oz    Constitutional: awake, alert, cooperative, and no apparent distress  Eyes: sclera clear  Respiratory: no increased work of breathing, good air exchange, no retractions, and clear to auscultation  Cardiovascular: regular rate and rhythm and normal S1 and S2  GI: normal bowel sounds, soft, non-distended, and non-tender  Skin: no bruising or bleeding  Musculoskeletal: no lower extremity pitting edema present  Neurologic: Mental Status Exam:  Level of Alertness:   awake, hand  5/5, motor equal, speech fluent. Numbness right above right face and right finger tips   Neuropsychiatric: General: normal, calm, and normal eye contact    Medical Decision Making       55 MINUTES SPENT BY ME on the date of service doing chart review, history, exam, documentation & further activities per the note.      Data     I have personally reviewed the following data over the past 24 hrs:    6.4  \   16.9   / 202     139 102 14.5 /  103 (H)   4.2 29 0.81 \     ALT: N/A AST: N/A AP: N/A TBILI: N/A   ALB: 4.2 TOT PROTEIN: N/A LIPASE: N/A     Trop: 7 BNP: N/A     TSH: N/A T4: N/A A1C: 5.7 (H)     INR:  0.95 PTT:  26   D-dimer:  N/A Fibrinogen:  N/A       Imaging results reviewed over the past 24 hrs:   Recent Results (from the past 24 hours)   CTA Head Neck with  Contrast    Narrative    EXAM: CTA HEAD NECK W CONTRAST  LOCATION: [facility]  DATE: 5/7/2025    INDICATION: Code Stroke, evaluate for LVO. PLEASE READ IMMEDIATELY. Right facial droop and right hand numbness.  COMPARISON: None.  CONTRAST: isovue 370 67ml  TECHNIQUE: Head and neck CT angiogram with IV contrast. Noncontrast head CT followed by axial helical CT images of the head and neck vessels obtained during the arterial phase of intravenous contrast administration. Axial 2D reconstructed images and   multiplanar 3D MIP reconstructed images of the head and neck vessels were performed by the technologist. Dose reduction techniques were used. All stenosis measurements made according to NASCET criteria unless otherwise specified.    FINDINGS:   NONCONTRAST HEAD CT:   INTRACRANIAL CONTENTS: No intracranial hemorrhage, extraaxial collection, or mass effect.  No CT evidence of acute infarct. Normal parenchymal attenuation. Normal ventricles and sulci.     VISUALIZED ORBITS/SINUSES/MASTOIDS: No intraorbital abnormality. No paranasal sinus mucosal disease. No middle ear or mastoid effusion.    BONES/SOFT TISSUES: No acute abnormality.    HEAD CTA:  ANTERIOR CIRCULATION: There is minimal atherosclerotic plaque cavernous ICA segments. No stenosis/occlusion, aneurysm, or high flow vascular malformation. Developmentally hypoplastic right A1 anterior cerebral artery segment. Fetal supply of the right   posterior cerebral artery.    POSTERIOR CIRCULATION: No stenosis/occlusion, aneurysm, or high flow vascular malformation. Balanced vertebral arteries supply a normal basilar artery.     DURAL VENOUS SINUSES: Expected enhancement of the major dural venous sinuses.    NECK CTA:  RIGHT CAROTID: No measurable stenosis or dissection.    LEFT CAROTID: There is minimal atherosclerotic plaque the left carotid bifurcation. No measurable stenosis or dissection.    VERTEBRAL ARTERIES: No focal stenosis or  dissection. Balanced vertebral arteries.    AORTIC ARCH: Classic aortic arch anatomy with no significant stenosis at the origin of the great vessels.    NONVASCULAR STRUCTURES: There is moderate degenerative disc disease at C5-C6 with disc osteophyte complex resulting in moderate spinal canal stenosis.      Impression    IMPRESSION:   HEAD CT:  1.  No evidence of intracranial hemorrhage, large territorial infarct, or other acute intracranial abnormality.    HEAD CTA:   1.  No high-grade stenosis or occlusion of the major arteries in the head.    NECK CTA:  1.  No high-grade stenosis or occlusion of the major arteries in the neck.        Imaging findings discussed with Dr. Shi by Dr. Garcia on 5/7/2025 2:49 PM CDT   MR Brain w/o & w Contrast    Narrative    EXAM: MR BRAIN W/O and W CONTRAST  LOCATION: [facility]  DATE: 5/7/2025    INDICATION: Numbness R face R hand, R facial droop  COMPARISON: MRI 4/10/2023, CT 5/7/2025.  CONTRAST: 9 mL Gadavist  TECHNIQUE: Routine multiplanar multisequence head MRI without and with intravenous contrast.    FINDINGS:  INTRACRANIAL CONTENTS: No acute or subacute infarct. No mass, acute hemorrhage, or extra-axial fluid collections. Scattered nonspecific T2/FLAIR hyperintensities within the cerebral white matter most consistent with mild chronic microvascular ischemic   change. Normal ventricles and sulci. Normal position of the cerebellar tonsils. No pathologic contrast enhancement.    SELLA: No abnormality accounting for technique.    OSSEOUS STRUCTURES/SOFT TISSUES: Normal marrow signal. The major intracranial vascular flow voids are maintained.     ORBITS: No abnormality accounting for technique.     SINUSES/MASTOIDS: No paranasal sinus mucosal disease. No middle ear or mastoid effusion.       Impression    IMPRESSION:  1.  No acute intracranial process.  2.  Mild chronic microvascular ischemic changes.

## 2025-05-08 NOTE — PLAN OF CARE
Goal Outcome Evaluation:    Alert and oriented.  Up independently in the room. Tolerating diet.  No complaints of pain.  Nih 1,1. Still the right lip and finger tips numb.  MRI repeat done along with the spinal MRI.

## 2025-05-08 NOTE — PROGRESS NOTES
Occupational Therapy: Orders received. Chart reviewed and discussed with care team.? Per report, pt defers the need for formal Occupational Therapy evaluation. Pt has been independent in room/performing ADLs. Will complete orders.    Brii ALEXANDER/DIONE

## 2025-05-08 NOTE — PLAN OF CARE
Goal Outcome Evaluation:    Pt admitted this evening for TIA. NIH score of 1 for right facial and UE numbness. MRI negative. Repeat MRI at 0600 tomorrow. Passed bedside swallow study. Neuro following. NSR on the monitor. Pt had no complaints of pain. No nausea noted. HS sugar was 121. IVF. Pt is alert and orientated. Up independently with transfers and ambulation. Showered this evening. Spouse and brother came by to visit. Will continue to monitor.

## 2025-05-08 NOTE — CONSULTS
NEUROLOGY CONSULTATION NOTE     Joseph Ochoa,  1970, MRN [id number] Date: 2025     [facility]   Code status:  Full Code   PCP: Osvaldo Telles, [phone]      ASSESSMENT & PLAN   Diagnosis code: Strokelike symptoms    Strokelike symptoms-rule out TIA  Numbness of the right face and hand    MRI brain negative for acute stroke  Repeat MRI brain/MRI C-spine  CT angiogram negative for large vessel occlusion  Echocardiogram/cardiac monitoring  Lipid panel statin  Aspirin Plavix for 3 weeks followed by aspirin only  Blood pressure can slowly be normalized depending on results of the MRI  PT/OT       Chief Complaint   Patient presents with    Numbness        HISTORY OF PRESENT ILLNESS     We have been requested by Dr. Loera to evaluate Joseph Ochoa who is a 55 year old  male for evaluation of strokelike symptoms.  This is a 55-year-old male who presented to the emergency room with sudden right sided numbness involving the hand and face.  He does have a history of hypertension and hyperlipidemia.  Symptoms were not provoked by any particular activity he was working in the yard.  They are slowly getting better at this point.  He was complaining of paresthesias in the first 3 digits of the right hand along with V2 V3 distribution of the right face.  No headaches.  No chest pains or palpitations.  No pain.  No neck pain.  Has seen a chiropractic in the past.  He was not a candidate for thrombolytics.  MRI C-spine/repeat MRI brain was recommended by the stroke neurologist.  Patient was started on aspirin and Plavix.  At baseline he does not take any aspirin.     PAST MEDICAL & SURGICAL HISTORY     Medical History  History of hypertension, hyperlipidemia    Surgical History  Past Surgical History:   Procedure Laterality Date    APPENDECTOMY          SOCIAL HISTORY     Social History     Tobacco Use    Smoking status: Never    Smokeless tobacco: Never   Substance Use Topics    Alcohol use: Yes      Comment: Alcoholic Drinks/day: occasional    Drug use: Yes     Types: Marijuana     Comment: Drug use: Daily marijuana use        FAMILY HISTORY     Reviewed, and family history includes Depression in his mother; Diabetes in his maternal grandfather, maternal grandmother, and mother; Heart Disease in his maternal grandfather, maternal grandmother, and paternal grandmother; Hypertension in his maternal grandfather, maternal grandmother, and paternal grandmother; Substance Abuse in his mother.     ALLERGIES     Allergies   Allergen Reactions    Ibuprofen Other (See Comments)     Kidney problems   IGA nephrothy    Lisinopril Other (See Comments)        REVIEW OF SYSTEMS     Complete review of systems done and negative within the HPI.  Pertinent positives on the HPI.     HOME & HOSPITAL MEDICATIONS     Prior to Admission Medications  Medications Prior to Admission   Medication Sig Dispense Refill Last Dose/Taking    ezetimibe (ZETIA) 10 MG tablet Take 10 mg by mouth every evening.   5/6/2025 Evening    GLUCOSAMINE-CHONDROITIN PO Take 1 tablet by mouth every evening.   5/6/2025 Evening    losartan (COZAAR) 50 MG tablet Take 50 mg by mouth every morning.   5/7/2025 Morning    Multiple Vitamins-Minerals (MENS 50+ MULTIVITAMIN PO) Take 1 tablet by mouth every morning.   5/7/2025 Morning    omeprazole (PRILOSEC) 20 MG capsule Take 20 mg by mouth every morning.   5/7/2025 Morning       Hospital Medications  Current Facility-Administered Medications   Medication Dose Route Frequency Provider Last Rate Last Admin    aspirin EC tablet 81 mg  81 mg Oral Daily Gondal, Saad J, MD        atorvastatin (LIPITOR) tablet 40 mg  40 mg Oral or NG Tube Daily at 8 pm Gondal, Saad J, MD   40 mg at 05/07/25 2048    clopidogrel (PLAVIX) tablet 75 mg  75 mg Oral Daily Gondal, Saad J, MD        ezetimibe (ZETIA) tablet 10 mg  10 mg Oral QPM Gondal, Saad J, MD        [Held by provider] losartan (COZAAR) tablet 50 mg  50 mg Oral QAM Gondal, Saad  "MD TUNDE        pantoprazole (PROTONIX) EC tablet 40 mg  40 mg Oral QAM AC Gondal, Saad J, MD            PHYSICAL EXAM     Vital signs  Temp:  [97  F (36.1  C)-98.6  F (37  C)] 97.6  F (36.4  C)  Pulse:  [66-90] 66  Resp:  [18-22] 18  BP: (165-199)/() 167/87  SpO2:  [94 %-97 %] 94 %      PHYSICAL EXAMINATION  VITALS: BP (!) 150/98 (BP Location: Left arm)   Pulse 73   Temp 98  F (36.7  C) (Oral)   Resp 20   Ht 1.702 m (5' 7\")   Wt 90.3 kg (199 lb)   SpO2 95%   BMI 31.17 kg/m    GENERAL -Health appearing, No apparent distress  EYES- No scleral icterus, no eyelid droop, Pupils - see Neuro section  HEENT - Normocephalic, atraumatic, Hearing grossly intact; Oral mucosa moist and pink in color. External Ears and nose intact.   Neck - supple   PULM - Good spontaneous respiratory effort  CV-extremities warm and well-perfused.  MSK- Gait - see Neuro section; Strength and tone- see Neuro section; Range of motion grossly intact.  PSYCH -cooperative    Neurological  Mental status - Patient is awake and grossly oriented to self, place and time. Attention span is normal. Language is fluent and follows commands appropriately.   Cranial nerves - CN II-XII intact. Pupils are reactive and symmetric; EOMI, VFIT, NLF symmetric  Motor - There is no pronator drift. Motor exam shows 5/5 strength in all extremities.  Tone - Tone is symmetric bilaterally in upper and lower extremities.  Reflexes -toes equivocal.  Sensation - Sensory exam is grossly intact to light touch, pain.  Coordination - Finger to nose and heel to shin is without dysmetria.  Gait and station --able to ambulate with assistance.  Formal gait testing cannot be done due to safety concerns from ongoing medical medications.       DIAGNOSTIC STUDIES     Pertinent Radiology   MRI brain  IMPRESSION:  1.  No acute intracranial process.  2.  Mild chronic microvascular ischemic changes.    HEAD CT:  1.  No evidence of intracranial hemorrhage, large territorial infarct, " or other acute intracranial abnormality.     HEAD CTA:   1.  No high-grade stenosis or occlusion of the major arteries in the head.     NECK CTA:  1.  No high-grade stenosis or occlusion of the major arteries in the neck.    LABS  Component      Latest Ref Rng 5/7/2025  2:50 PM   Cholesterol      <200 mg/dL 222 (H)    Triglycerides      <150 mg/dL 167 (H)    HDL Cholesterol      >=40 mg/dL 62    LDL Cholesterol Calculated      <100 mg/dL 127 (H)    Non HDL Cholesterol      <130 mg/dL 160 (H)    Estimated Average Glucose      <117 mg/dL 117 (H)    Hemoglobin A1C      <5.7 % 5.7 (H)       Legend:  (H) High    Recent Results (from the past 24 hours)   Glucose by meter    Collection Time: 05/07/25  2:34 PM   Result Value Ref Range    GLUCOSE BY METER POCT 98 70 - 99 mg/dL   Basic metabolic panel    Collection Time: 05/07/25  2:50 PM   Result Value Ref Range    Sodium 139 135 - 145 mmol/L    Potassium 3.7 3.4 - 5.3 mmol/L    Chloride 102 98 - 107 mmol/L    Carbon Dioxide (CO2) 23 22 - 29 mmol/L    Anion Gap 14 7 - 15 mmol/L    Urea Nitrogen 18.1 6.0 - 20.0 mg/dL    Creatinine 0.89 0.67 - 1.17 mg/dL    GFR Estimate >90 >60 mL/min/1.73m2    Calcium 10.2 8.8 - 10.4 mg/dL    Glucose 88 70 - 99 mg/dL   INR    Collection Time: 05/07/25  2:50 PM   Result Value Ref Range    INR 0.95 0.85 - 1.15    PT 12.9 11.8 - 14.8 Seconds   Partial thromboplastin time    Collection Time: 05/07/25  2:50 PM   Result Value Ref Range    aPTT 26 22 - 38 Seconds   Troponin T, High Sensitivity    Collection Time: 05/07/25  2:50 PM   Result Value Ref Range    Troponin T, High Sensitivity <6 <=22 ng/L   CBC with platelets and differential    Collection Time: 05/07/25  2:50 PM   Result Value Ref Range    WBC Count 6.4 4.0 - 11.0 10e3/uL    RBC Count 5.56 4.40 - 5.90 10e6/uL    Hemoglobin 16.9 13.3 - 17.7 g/dL    Hematocrit 48.6 40.0 - 53.0 %    MCV 87 78 - 100 fL    MCH 30.4 26.5 - 33.0 pg    MCHC 34.8 31.5 - 36.5 g/dL    RDW 13.0 10.0 - 15.0 %     Platelet Count 202 150 - 450 10e3/uL    % Neutrophils 53 %    % Lymphocytes 35 %    % Monocytes 9 %    % Eosinophils 2 %    % Basophils 1 %    % Immature Granulocytes 1 %    NRBCs per 100 WBC 0 <1 /100    Absolute Neutrophils 3.4 1.6 - 8.3 10e3/uL    Absolute Lymphocytes 2.2 0.8 - 5.3 10e3/uL    Absolute Monocytes 0.6 0.0 - 1.3 10e3/uL    Absolute Eosinophils 0.1 0.0 - 0.7 10e3/uL    Absolute Basophils 0.0 0.0 - 0.2 10e3/uL    Absolute Immature Granulocytes 0.0 <=0.4 10e3/uL    Absolute NRBCs 0.0 10e3/uL   Extra Red Top Tube    Collection Time: 05/07/25  2:50 PM   Result Value Ref Range    Hold Specimen [facility]    Hemoglobin A1c    Collection Time: 05/07/25  2:50 PM   Result Value Ref Range    Estimated Average Glucose 117 (H) <117 mg/dL    Hemoglobin A1C 5.7 (H) <5.7 %   Lipid panel    Collection Time: 05/07/25  2:50 PM   Result Value Ref Range    Cholesterol 222 (H) <200 mg/dL    Triglycerides 167 (H) <150 mg/dL    Direct Measure HDL 62 >=40 mg/dL    LDL Cholesterol Calculated 127 (H) <100 mg/dL    Non HDL Cholesterol 160 (H) <130 mg/dL   Troponin T, High Sensitivity    Collection Time: 05/07/25  8:11 PM   Result Value Ref Range    Troponin T, High Sensitivity 15 <=22 ng/L   Glucose by meter    Collection Time: 05/07/25 10:05 PM   Result Value Ref Range    GLUCOSE BY METER POCT 121 (H) 70 - 99 mg/dL   Troponin T, High Sensitivity    Collection Time: 05/07/25 10:20 PM   Result Value Ref Range    Troponin T, High Sensitivity 7 <=22 ng/L       Total time spent for face to face visit, reviewing labs/imaging studies, counseling and coordination of care was: Over 80 min  More than 50% of this time was spent on counseling and coordination of care.    Reviewing chart/testing.  Patient new to me.  Counseling patient.  High risk.    Jesse Turner MD  Neurologist  [facility]  Tel:- [phone]

## 2025-05-08 NOTE — PLAN OF CARE
"Problem: Comorbidity Management  Goal: Blood Pressure in Desired Range  Outcome: Progressing  Intervention: Maintain Blood Pressure Management  Recent Flowsheet Documentation  Taken 5/8/2025 0000 by Kiran Villavicencio RN  Medication Review/Management: medications reviewed     Problem: Stroke, Ischemic (Includes Transient Ischemic Attack)  Goal: Improved Sensorimotor Function  Outcome: Progressing     Problem: Adult Inpatient Plan of Care  Goal: Plan of Care Review  Description: The Plan of Care Review/Shift note should be completed every shift.  The Outcome Evaluation is a brief statement about your assessment that the patient is improving, declining, or no change.  This information will be displayed automatically on your shiftnote.  Outcome: Progressing  Flowsheets (Taken 5/8/2025 0523)  Overall Patient Progress: improving   Goal Outcome Evaluation:    Patient is alert and oriented x 4. Stated feels numbness still present to right side of face. Right upper arm numbness has improved. Pt states right upper arm is \"mildly numb.\" NIH score = 1. Tele is NSR. BP elevated but not meeting parameter to notify MD.          Overall Patient Progress: improvingOverall Patient Progress: improving               "

## 2025-05-08 NOTE — PROGRESS NOTES
Physical Therapy: Orders received. Chart reviewed and discussed with care team.? Physical Therapy not indicated due to patient declining evaluation. Spoke with patient, introduced role of and rationale for physical therapy. Patient has been mobilizing independently around hospital room and reports no concerns with mobility.? Defer discharge recommendations to medical team.? Will complete orders.     Yael Gandara, PT  5/8/2025

## 2025-05-08 NOTE — PLAN OF CARE
Speech Language Pathology: Orders received. Chart reviewed and met with patient.? Speech Language Pathology not indicated due to no current speech/language or swallow concerns.? Defer discharge recommendations to care team.? Will complete orders.

## 2025-05-08 NOTE — SIGNIFICANT EVENT
Pt was unaware to not take his own medications and took 3 of them this am.     Losartan, ezetimibe and omeprazole.     Md notified and pt is aware not to take any of his home medication going forward.

## 2025-05-09 ENCOUNTER — APPOINTMENT (OUTPATIENT)
Dept: CARDIOLOGY | Facility: HOSPITAL | Age: 55
DRG: 066 | End: 2025-05-09
Attending: INTERNAL MEDICINE
Payer: COMMERCIAL

## 2025-05-09 VITALS
HEIGHT: 67 IN | WEIGHT: 198.19 LBS | DIASTOLIC BLOOD PRESSURE: 100 MMHG | HEART RATE: 75 BPM | OXYGEN SATURATION: 96 % | BODY MASS INDEX: 31.11 KG/M2 | RESPIRATION RATE: 18 BRPM | SYSTOLIC BLOOD PRESSURE: 145 MMHG | TEMPERATURE: 97.5 F

## 2025-05-09 LAB
ANION GAP SERPL CALCULATED.3IONS-SCNC: 6 MMOL/L (ref 7–15)
BUN SERPL-MCNC: 14.8 MG/DL (ref 6–20)
CALCIUM SERPL-MCNC: 9.5 MG/DL (ref 8.8–10.4)
CHLORIDE SERPL-SCNC: 102 MMOL/L (ref 98–107)
CREAT SERPL-MCNC: 0.96 MG/DL (ref 0.67–1.17)
EGFRCR SERPLBLD CKD-EPI 2021: >90 ML/MIN/1.73M2
GLUCOSE SERPL-MCNC: 114 MG/DL (ref 70–99)
HCO3 SERPL-SCNC: 31 MMOL/L (ref 22–29)
HOLD SPECIMEN: NORMAL
POTASSIUM SERPL-SCNC: 4.5 MMOL/L (ref 3.4–5.3)
SODIUM SERPL-SCNC: 139 MMOL/L (ref 135–145)

## 2025-05-09 PROCEDURE — 80048 BASIC METABOLIC PNL TOTAL CA: CPT | Performed by: INTERNAL MEDICINE

## 2025-05-09 PROCEDURE — 99239 HOSP IP/OBS DSCHRG MGMT >30: CPT | Performed by: INTERNAL MEDICINE

## 2025-05-09 PROCEDURE — 36415 COLL VENOUS BLD VENIPUNCTURE: CPT | Performed by: INTERNAL MEDICINE

## 2025-05-09 PROCEDURE — 93270 REMOTE 30 DAY ECG REV/REPORT: CPT

## 2025-05-09 PROCEDURE — 250N000013 HC RX MED GY IP 250 OP 250 PS 637: Performed by: INTERNAL MEDICINE

## 2025-05-09 PROCEDURE — 250N000013 HC RX MED GY IP 250 OP 250 PS 637: Performed by: STUDENT IN AN ORGANIZED HEALTH CARE EDUCATION/TRAINING PROGRAM

## 2025-05-09 PROCEDURE — 93306 TTE W/DOPPLER COMPLETE: CPT | Mod: 26 | Performed by: INTERNAL MEDICINE

## 2025-05-09 PROCEDURE — 99233 SBSQ HOSP IP/OBS HIGH 50: CPT | Performed by: PSYCHIATRY & NEUROLOGY

## 2025-05-09 RX ORDER — PANTOPRAZOLE SODIUM 40 MG/1
40 TABLET, DELAYED RELEASE ORAL
Qty: 30 TABLET | Refills: 0 | Status: SHIPPED | OUTPATIENT
Start: 2025-05-10

## 2025-05-09 RX ORDER — CLOPIDOGREL BISULFATE 75 MG/1
75 TABLET ORAL DAILY
Qty: 20 TABLET | Refills: 0 | Status: SHIPPED | OUTPATIENT
Start: 2025-05-10

## 2025-05-09 RX ORDER — ASPIRIN 81 MG/1
81 TABLET, COATED ORAL DAILY
Qty: 30 TABLET | Refills: 11 | Status: SHIPPED | OUTPATIENT
Start: 2025-05-10

## 2025-05-09 RX ORDER — LOSARTAN POTASSIUM 25 MG/1
25 TABLET ORAL DAILY
Status: DISCONTINUED | OUTPATIENT
Start: 2025-05-09 | End: 2025-05-09 | Stop reason: HOSPADM

## 2025-05-09 RX ORDER — LOSARTAN POTASSIUM 25 MG/1
25 TABLET ORAL DAILY
Qty: 30 TABLET | Refills: 0 | Status: SHIPPED | OUTPATIENT
Start: 2025-05-10

## 2025-05-09 RX ORDER — ATORVASTATIN CALCIUM 40 MG/1
40 TABLET, FILM COATED ORAL AT BEDTIME
Qty: 30 TABLET | Refills: 1 | Status: SHIPPED | OUTPATIENT
Start: 2025-05-09

## 2025-05-09 RX ADMIN — PANTOPRAZOLE SODIUM 40 MG: 40 TABLET, DELAYED RELEASE ORAL at 06:31

## 2025-05-09 RX ADMIN — CLOPIDOGREL 75 MG: 75 TABLET ORAL at 09:16

## 2025-05-09 RX ADMIN — ASPIRIN 81 MG: 81 TABLET, COATED ORAL at 09:16

## 2025-05-09 RX ADMIN — LOSARTAN POTASSIUM 25 MG: 25 TABLET, FILM COATED ORAL at 09:16

## 2025-05-09 ASSESSMENT — ACTIVITIES OF DAILY LIVING (ADL)
ADLS_ACUITY_SCORE: 25

## 2025-05-09 NOTE — PROGRESS NOTES
[facility]    Medicine Progress Note - Hospitalist Service    Date of Admission:  5/7/2025    Assessment & Plan   55 year old male admitted on 5/7/2025. PMHx of HTN and HLD. He presented to ED for evaluation of sudden onset right sided facial numbness and right hand numbness that occurred at 1420 today while at work.  Patient describes paresthesias initially from the right cheekbone to the right chin and spanning to right side of nose without crossing midline. Paresthesias also noted to the right 1st, 2nd and 3rd digit.     1.Acute ischemic CVA  -intermit right face nunmbness and right hand  -first mri neg but second one was positive   HEAD MRI:   1.  Punctate acute nonhemorrhagic left thalamic lacunar infarct without mass effect.  2.  Mild scattered bilateral cerebral white matter disease, nonspecific although most likely the sequela of chronic microvascular ischemia.     CERVICAL SPINE MRI:  1.  Multilevel spondylosis with mild spinal canal stenosis at C5-C6, to a lesser extent at C3-C4. No abnormal spinal cord signal intensity.  2.  Multilevel foraminal narrowing, worst and severe at C5-C6 on the right, moderate-severe at C3-C4 and C5-C6 on the left. See above for details.    -facial numbness on day of discharge very minimal on right upper lip and tip thumb and index finder    -pt/ot eval  -neurology eval  -plavix + asa x 3 weeks, then asa  -home cardiac monitor ordered  -follow up with neurology in 2  months, sooner if needed for hand numbness for EMG    -checked echo 5/8/25  -1. Normal left ventricular size and systolic performance. The visually  estimated ejection fraction is 60%.  2. No significant valvular heart disease is identified on this study.  3. Normal right ventricular size and systolic performance.  4. Echo contrast examination was performed using agitated crystalloid as  contrast agent. The right heart opacity was good and the left heart was well  visualized. There was no  "evidence of right-to-left shunting during spontaneous  respiration or following release of Valsalva.    -carotid ultrasound  1.  Mild plaque formation, velocities consistent with less than 50% stenosis in the right internal carotid artery.  2.  Mild plaque formation, velocities consistent with less than 50% stenosis in the left internal carotid artery.  3.  Flow within the vertebral arteries is antegrade.    2.Hx of htn  -allowing higher bp, for now restarted half his regular dose losartan and will follow up with PCP next week  -he will check bp at home and will have parameters     3.Lipids  -stopped Lipitor with arthralgias in past  -on Zetia   -neuro added back statin,checked ck here normal    4.Smokes marijuana              Diet: Regular Diet Adult    DVT Prophylaxis: none  Irizarry Catheter: Not present  Lines: None     Cardiac Monitoring: ACTIVE order. Indication: Stroke, acute (48 hours)  Code Status: Full Code      Clinically Significant Risk Factors                   # Hypertension: Noted on problem list            # Obesity: Estimated body mass index is 31.04 kg/m  as calculated from the following:    Height as of this encounter: 1.702 m (5' 7\").    Weight as of this encounter: 89.9 kg (198 lb 3.1 oz)., PRESENT ON ADMISSION            Social Drivers of Health    Depression: At risk (1/18/2024)    Received from [facility]    PHQ-2     PHQ-2 TOTAL SCORE: 4          Disposition Plan     Medically Ready for Discharge: Anticipated Today             Do West MD  Hospitalist Service  [facility]  Securely message with ShowEvidence (more info)  Text page via inContact Paging/Directory   ______________________________________________________________________    Interval History   He feels ok  Very little numbness on top right lip, small area  And numbness tip thumb and index finder right hand  No motor issues  No pain      Physical Exam   Vital Signs: Temp: 97.5 "  F (36.4  C) Temp src: Oral BP: (!) 145/100 Pulse: 75   Resp: 18 SpO2: 96 % O2 Device: None (Room air)    Weight: 198 lbs 3.1 oz    Constitutional: awake, alert, cooperative, and no apparent distress  Eyes: sclera clear  ENT: normocephalic, without obvious abnormality  Respiratory: no increased work of breathing, good air exchange, no retractions, and clear to auscultation  Cardiovascular: regular rate and rhythm and normal S1 and S2  GI: normal bowel sounds, soft, non-distended, and non-tender  Skin: no bruising or bleeding  Musculoskeletal: no lower extremity pitting edema present  Neurologic: Mental Status Exam:  Level of Alertness:   awake  Language:  normal motor equal  Neuropsychiatric: Affect: normal and pleasant        Data     I have personally reviewed the following data over the past 24 hrs:    N/A  \   N/A   / N/A     139 102 14.8 /  114 (H)   4.5 31 (H) 0.96 \       Imaging results reviewed over the past 24 hrs:   Recent Results (from the past 24 hours)   MR Brain w/o & w Contrast    Narrative    EXAM: MR CERVICAL SPINE W/O and W CONTRAST, MR BRAIN W/O and W CONTRAST  LOCATION: [facility]  DATE: 5/8/2025    INDICATION: Recurrence of stroke like symptoms with right-sided paresthesias of the face, neck, and hand  COMPARISON: Brain MRI dated 5/7/2025  CONTRAST: 9mL Gadavist  TECHNIQUE:   1) Routine multiplanar multisequence head MRI without and with intravenous contrast  2) Routine Cervical Spine MRI without and with IV contrast    FINDINGS:  HEAD MRI:  INTRACRANIAL CONTENTS: Not appreciated on the comparison exam, a subtle punctate left thalamic focus of restricted diffusion with subtle corresponding low ADC values and T2/FLAIR hyperintensity without mass effect, most consistent with an acute infarct   (for example, series 3 image 53; series 6 image 61; and series 5 image 14). No acute hemorrhage, extra axial fluid collection, mass, or abnormal enhancement. Several additional  scattered foci of T2 prolongation within the bilateral cerebral white matter,   nonspecific although most likely the sequela of mild chronic microvascular ischemia. Normal ventricles and sulci for age. Normal position of the cerebellar tonsils.     SELLA: Within technique limitations, no gross abnormality.    OSSEOUS STRUCTURES/SOFT TISSUES: No suspicious bone marrow signal intensity. The major intracranial vascular flow voids are maintained.     ORBITS: Within technique limitations, no significant abnormality.     SINUSES/MASTOIDS: Trace scattered paranasal sinus mucosal thickening without an air-fluid level. No mastoid effusion.      CERVICAL SPINE:   Motion degraded exam. Within this overall mild limitation:    Reversal of the normal cervical lordosis without significant spondylolisthesis. Maintained vertebral body heights. Multilevel interbody degenerative change with intervertebral disc desiccation, worst at C5-C6 where there is mild associated disc height   loss and Modic type II/III degenerative change. No suspicious bone marrow signal intensity or significant focal edema. No pathologic restricted diffusion or enhancement. Unremarkable spinal cord signal intensity. Incidental 2.4 x 1.5 x 3.0 cm   subcutaneous lipoma within the right dorsal neck. Otherwise, within technique limitations, grossly unremarkable extraspinal structures.    Craniovertebral junction and C1-C2: Mild atlantodental and bilateral atlantooccipital degenerative change without significant spinal canal stenosis.    C2-C3: A tiny posterior disc-osteophyte complex and mild buckling of the ligamenta flava without significant spinal canal stenosis. Bilateral uncovertebral and facet arthrosis, worse on the left where there is mild foraminal narrowing. No significant   right foraminal stenosis.     C3-C4: A posterior disc-osteophyte complex including a left paracentral disc protrusion which, along with buckling of the ligamenta flava and  uncovertebral arthropathy (worse and moderate on the left), contributes to mild spinal canal stenosis with   effacement of the left lateral recess and hemicord. Mild left facet arthrosis. Moderate-severe left and mild right foraminal narrowing.    C4-C5: A posterior disc-osteophyte complex including a central disc protrusion and buckling of the ligamenta flava effacing the ventral spinal cord without significant spinal canal stenosis. Mild bilateral uncovertebral and facet arthrosis with mild   right foraminal narrowing. No significant left foraminal stenosis.    C5-C6: Posterior disc-osteophyte complex and buckling of the ligamenta flava with mild spinal canal stenosis. Severe right and moderate-severe left foraminal narrowing predominantly due to uncovertebral arthropathy with mild bilateral facet arthrosis.     C6-C7: A posterior disc-osteophyte complex partially effacing the ventral thecal sac without significant spinal canal stenosis. Moderate left and mild right foraminal narrowing predominantly due to uncovertebral arthropathy with mild bilateral facet   arthrosis.     C7-T1: A small posterior disc-osteophyte complex effacing the ventral thecal sac without significant spinal canal stenosis. Bilateral uncovertebral and facet arthrosis with mild-moderate left foraminal narrowing. No significant right foraminal stenosis.      Impression    IMPRESSION:  HEAD MRI:   1.  Punctate acute nonhemorrhagic left thalamic lacunar infarct without mass effect.  2.  Mild scattered bilateral cerebral white matter disease, nonspecific although most likely the sequela of chronic microvascular ischemia.    CERVICAL SPINE MRI:  1.  Multilevel spondylosis with mild spinal canal stenosis at C5-C6, to a lesser extent at C3-C4. No abnormal spinal cord signal intensity.  2.  Multilevel foraminal narrowing, worst and severe at C5-C6 on the right, moderate-severe at C3-C4 and C5-C6 on the left. See above for details.   MR Cervical Spine  w/o & w Contrast    Narrative    EXAM: MR CERVICAL SPINE W/O and W CONTRAST, MR BRAIN W/O and W CONTRAST  LOCATION: [facility]  DATE: 5/8/2025    INDICATION: Recurrence of stroke like symptoms with right-sided paresthesias of the face, neck, and hand  COMPARISON: Brain MRI dated 5/7/2025  CONTRAST: 9mL Gadavist  TECHNIQUE:   1) Routine multiplanar multisequence head MRI without and with intravenous contrast  2) Routine Cervical Spine MRI without and with IV contrast    FINDINGS:  HEAD MRI:  INTRACRANIAL CONTENTS: Not appreciated on the comparison exam, a subtle punctate left thalamic focus of restricted diffusion with subtle corresponding low ADC values and T2/FLAIR hyperintensity without mass effect, most consistent with an acute infarct   (for example, series 3 image 53; series 6 image 61; and series 5 image 14). No acute hemorrhage, extra axial fluid collection, mass, or abnormal enhancement. Several additional scattered foci of T2 prolongation within the bilateral cerebral white matter,   nonspecific although most likely the sequela of mild chronic microvascular ischemia. Normal ventricles and sulci for age. Normal position of the cerebellar tonsils.     SELLA: Within technique limitations, no gross abnormality.    OSSEOUS STRUCTURES/SOFT TISSUES: No suspicious bone marrow signal intensity. The major intracranial vascular flow voids are maintained.     ORBITS: Within technique limitations, no significant abnormality.     SINUSES/MASTOIDS: Trace scattered paranasal sinus mucosal thickening without an air-fluid level. No mastoid effusion.      CERVICAL SPINE:   Motion degraded exam. Within this overall mild limitation:    Reversal of the normal cervical lordosis without significant spondylolisthesis. Maintained vertebral body heights. Multilevel interbody degenerative change with intervertebral disc desiccation, worst at C5-C6 where there is mild associated disc height   loss and Modic  type II/III degenerative change. No suspicious bone marrow signal intensity or significant focal edema. No pathologic restricted diffusion or enhancement. Unremarkable spinal cord signal intensity. Incidental 2.4 x 1.5 x 3.0 cm   subcutaneous lipoma within the right dorsal neck. Otherwise, within technique limitations, grossly unremarkable extraspinal structures.    Craniovertebral junction and C1-C2: Mild atlantodental and bilateral atlantooccipital degenerative change without significant spinal canal stenosis.    C2-C3: A tiny posterior disc-osteophyte complex and mild buckling of the ligamenta flava without significant spinal canal stenosis. Bilateral uncovertebral and facet arthrosis, worse on the left where there is mild foraminal narrowing. No significant   right foraminal stenosis.     C3-C4: A posterior disc-osteophyte complex including a left paracentral disc protrusion which, along with buckling of the ligamenta flava and uncovertebral arthropathy (worse and moderate on the left), contributes to mild spinal canal stenosis with   effacement of the left lateral recess and hemicord. Mild left facet arthrosis. Moderate-severe left and mild right foraminal narrowing.    C4-C5: A posterior disc-osteophyte complex including a central disc protrusion and buckling of the ligamenta flava effacing the ventral spinal cord without significant spinal canal stenosis. Mild bilateral uncovertebral and facet arthrosis with mild   right foraminal narrowing. No significant left foraminal stenosis.    C5-C6: Posterior disc-osteophyte complex and buckling of the ligamenta flava with mild spinal canal stenosis. Severe right and moderate-severe left foraminal narrowing predominantly due to uncovertebral arthropathy with mild bilateral facet arthrosis.     C6-C7: A posterior disc-osteophyte complex partially effacing the ventral thecal sac without significant spinal canal stenosis. Moderate left and mild right foraminal narrowing  predominantly due to uncovertebral arthropathy with mild bilateral facet   arthrosis.     C7-T1: A small posterior disc-osteophyte complex effacing the ventral thecal sac without significant spinal canal stenosis. Bilateral uncovertebral and facet arthrosis with mild-moderate left foraminal narrowing. No significant right foraminal stenosis.      Impression    IMPRESSION:  HEAD MRI:   1.  Punctate acute nonhemorrhagic left thalamic lacunar infarct without mass effect.  2.  Mild scattered bilateral cerebral white matter disease, nonspecific although most likely the sequela of chronic microvascular ischemia.    CERVICAL SPINE MRI:  1.  Multilevel spondylosis with mild spinal canal stenosis at C5-C6, to a lesser extent at C3-C4. No abnormal spinal cord signal intensity.  2.  Multilevel foraminal narrowing, worst and severe at C5-C6 on the right, moderate-severe at C3-C4 and C5-C6 on the left. See above for details.   Echocardiogram Complete    Narrative    [id number]  [id number]  [id number]  [id number]^GONDAL^ANA^[place] [address]     Name: CHRIS ZAPATA  MRN: [id number]  : 1970  Study Date: 2025 03:29 PM  Age: 55 yrs  Gender: Male  Patient Location: [facility]  Reason For Study: TIA  Ordering Physician: GONDAL, SAAD J  Performed By: THOMAS     BSA: 2.0 m2  Height: 67 in  Weight: 199 lb  HR: 67  BP: 189/95 mmHg  ______________________________________________________________________________  Procedure  Bubble Echocardiogram with two-dimensional, color and spectral Doppler.  ______________________________________________________________________________  Interpretation Summary     1. Normal left ventricular size and systolic performance. The visually  estimated ejection fraction is 60%.  2. No significant valvular heart disease is identified on this study.  3. Normal right ventricular size and systolic performance.  4. Echo contrast examination was performed using  agitated crystalloid as  contrast agent. The right heart opacity was good and the left heart was well  visualized. There was no evidence of right-to-left shunting during spontaneous  respiration or following release of Valsalva.  ______________________________________________________________________________  Left ventricle:  Normal left ventricular size and systolic performance. The visually estimated  ejection fraction is 60%. There is normal regional wall motion. Left  ventricular wall thickness is normal.     Assessment of LV Diastolic Function: The cumulative findings suggest normal  diastolic filling [The septal e' velocity is > 7 cm/s & lateral e' velocity  is  > 10 cm/s. The average E/e' is < 14. TR velocity is < 2.8 m/s. Left atrial  volume index is less than 34 mL/mÂ ].     Right ventricle:  Normal right ventricular size and systolic performance.     Left atrium:  The left atrium is of normal size.     Right atrium:  The right atrium is of normal size.     IVC:  The IVC is of normal caliber.     Aortic valve:  The aortic valve is comprised of three cusps. No significant aortic stenosis  or aortic insufficiency is detected on this study.     Mitral valve:  The mitral valve appears morphologically normal. There is trace mitral  insufficiency.     Tricuspid valve:  The tricuspid valve is grossly morphologically normal. There is trace  tricuspid insufficiency.     Pulmonic valve:  The pulmonic valve is grossly morphologically normal.     Thoracic aorta:  There is borderline enlargement of the proximal ascending aorta.     Pericardium:  There is no significant pericardial effusion.  ______________________________________________________________________________  ______________________________________________________________________________  MMode/2D Measurements & Calculations  IVSd: 0.95 cm  LVIDd: 4.5 cm  LVIDs: 3.2 cm  LVPWd: 1.00 cm  FS: 28.5 %  LV mass(C)d: 149.1 grams  LV mass(C)dI: 73.9 grams/m2  Ao root  diam: 3.5 cm  LA dimension: 3.2 cm  asc Aorta Diam: 3.8 cm  LA/Ao: 0.91  LVOT diam: 2.1 cm  LVOT area: 3.5 cm2  Ao root diam index Ht(cm/m): 2.1  Ao root diam index BSA (cm/m2): 1.7  Asc Ao diam index BSA (cm/m2): 1.9  Asc Ao diam index Ht(cm/m): 2.2  EF Biplane: 57.5 %  LA Volume (BP): 49.9 ml     LA Volume Index (BP): 24.7 ml/m2  LA Volume Indexed (AL/bp): 26.7 ml/m2  RV Base: 4.0 cm  RWT: 0.44     TAPSE: 2.7 cm     Time Measurements  MM HR: 67.0 BPM     Doppler Measurements & Calculations  MV E max olivier: 61.7 cm/sec  MV A max olivier: 56.1 cm/sec  MV E/A: 1.1  MV dec slope: 250.0 cm/sec2  MV dec time: 0.25 sec  Ao V2 max: 140.0 cm/sec  Ao max P.0 mmHg  Ao V2 mean: 85.5 cm/sec  Ao mean PG: 3.0 mmHg  Ao V2 VTI: 25.5 cm  JONH(I,D): 2.9 cm2  JONH(V,D): 2.7 cm2  LV V1 max P.9 mmHg  LV V1 max: 111.0 cm/sec  LV V1 VTI: 21.6 cm  SV(LVOT): 74.8 ml  SI(LVOT): 37.1 ml/m2  PA acc time: 0.14 sec  TR max olivier: 221.0 cm/sec  TR max P.5 mmHg  AV Olivier Ratio (DI): 0.79  JONH Index (cm2/m2): 1.5  E/E': 5.6     E/E' av.2  Lateral E/e': 5.6  Medial E/e': 6.9  Peak E' Olivier: 11.1 cm/sec  RV S Olivier: 11.9 cm/sec     ______________________________________________________________________________  Report approved by: Stan Lee MD on 2025 07:55 AM         US Carotid Bilateral    Narrative    EXAM: US CAROTID BILATERAL  LOCATION: [facility]  DATE: 2025    INDICATION: 55 y o with stroke  COMPARISON: None.  TECHNIQUE: Duplex exam performed utilizing 2D gray-scale imaging, Doppler interrogation with color-flow and spectral waveform analysis. The percent diameter stenosis is determined using Updated Recommendations for Carotid Stenosis Interpretation Criteria   from [employer].    FINDINGS:    RIGHT: Mild plaque at the bifurcation. The peak systolic velocity in the ICA is less than 180 cm/sec, consistent with less than 50% stenosis. Normal velocities in the ECA. Antegrade flow within the  vertebral artery.     LEFT: Mild plaque at the bifurcation. The peak systolic velocity in the ICA is less than 180 cm/sec, consistent with less than 50% stenosis. Normal velocities in the ECA. Antegrade flow within the vertebral artery.    VELOCITY CHART:  CCA   Right: 92 cm/s   Left: 106 cm/s  ICA   Right: 80 cm/s   Left: 104 cm/s  ECA   Right: 126 cm/s   Left: 110 cm/s  ICA/CCA PSV Ratio   Right: 0.9   Left: 1.0      Impression    IMPRESSION:  1.  Mild plaque formation, velocities consistent with less than 50% stenosis in the right internal carotid artery.  2.  Mild plaque formation, velocities consistent with less than 50% stenosis in the left internal carotid artery.  3.  Flow within the vertebral arteries is antegrade.

## 2025-05-09 NOTE — PLAN OF CARE
Problem: Comorbidity Management  Goal: Blood Pressure in Desired Range  Outcome: Progressing  Intervention: Maintain Blood Pressure Management  Recent Flowsheet Documentation  Taken 5/8/2025 1600 by Breanne Bond, RN  Medication Review/Management: medications reviewed     Problem: Stroke, Ischemic (Includes Transient Ischemic Attack)  Goal: Optimal Coping  Outcome: Progressing  Goal: Effective Bowel Elimination  Outcome: Progressing  Goal: Optimal Cerebral Tissue Perfusion  Outcome: Progressing  Goal: Optimal Cognitive Function  Outcome: Progressing  Goal: Improved Communication Skills  Outcome: Progressing  Goal: Optimal Functional Ability  Outcome: Progressing  Intervention: Optimize Functional Ability  Recent Flowsheet Documentation  Taken 5/8/2025 2030 by Breanne Bond, RN  Activity Management: up ad howard  Taken 5/8/2025 1600 by Breanne Bond, RN  Activity Management: up ad howard  Goal: Optimal Nutrition Intake  Outcome: Progressing  Goal: Effective Oxygenation and Ventilation  Outcome: Progressing  Goal: Improved Sensorimotor Function  Outcome: Progressing  Goal: Safe and Effective Swallow  Outcome: Progressing  Goal: Effective Urinary Elimination  Outcome: Progressing       Goal Outcome Evaluation:    A/ox4. NIH 1 and 0. SR on tele. Pt anxious to leave d/t busy work schedule and projects in the next couple of days. Pt owns a construction company. MD page and then came to see pt and wife. Pt will be staying tonight. ECHO and US bilateral carotid done, pending results. Independent. Shower done. IVF and BG discontinue.

## 2025-05-09 NOTE — PLAN OF CARE
Problem: Comorbidity Management  Goal: Blood Pressure in Desired Range  Outcome: Progressing  Intervention: Maintain Blood Pressure Management  Recent Flowsheet Documentation  Taken 5/9/2025 0100 by Darren Francis RN  Medication Review/Management: medications reviewed     Problem: Adult Inpatient Plan of Care  Goal: Optimal Comfort and Wellbeing  Outcome: Progressing   Goal Outcome Evaluation:    Clustered cares overnight. Pt is pleasant and cooperative with care. Tele NSR. Independent in the room. PIV SL. NIHSS scoring 1 for numbness and tingling around mouth/right lip and fingertips on right hand. Regular diet. Pt is able to make needs known. Denies pain.

## 2025-05-09 NOTE — DISCHARGE SUMMARY
[facility] MEDICINE  DISCHARGE SUMMARY     Primary Care Physician: Osvaldo Telles  Admission Date: 5/7/2025   Discharge Provider: Do West MD Discharge Date: 5/9/2025   Diet:   Active Diet and Nourishment Order   Procedures    Regular Diet Adult    Diet       Code Status: Full Code   Activity: DCACTIVITY: Activity as tolerated        Condition at Discharge: Good     REASON FOR PRESENTATION(See Admission Note for Details)   Facial numbness and right hand    PRINCIPAL & ACTIVE DISCHARGE DIAGNOSES     Active Problems:    TIA (transient ischemic attack)    Paresthesia    Facial droop      PENDING LABS     Unresulted Labs Ordered in the Past 30 Days of this Admission       No orders found from 4/7/2025 to 5/8/2025.              PROCEDURES ( this hospitalization only)          RECOMMENDATIONS TO OUTPATIENT PROVIDER FOR F/U VISIT     Follow-up Appointments       Follow Up      Follow up with neurology clinic 2 months for stoke follow up and possible EMG        Hospital Follow-up with Existing Primary Care Provider (PCP)          Schedule Primary Care visit within: 3-5 Days (Urgent)                   DISPOSITION     Home    SUMMARY OF HOSPITAL COURSE:      55 year old male admitted on 5/7/2025. PMHx of HTN and HLD. He presented to ED for evaluation of sudden onset right sided facial numbness and right hand numbness that occurred at 1420 today while at work.  Patient describes paresthesias initially from the right cheekbone to the right chin and spanning to right side of nose without crossing midline. Paresthesias also noted to the right 1st, 2nd and 3rd digit.      1.Acute ischemic CVA  -intermit right face nunmbness and right hand  -first mri neg but second one was positive   HEAD MRI:   1.  Punctate acute nonhemorrhagic left thalamic lacunar infarct without mass effect.  2.  Mild scattered bilateral cerebral white matter disease, nonspecific although most likely the sequela  of chronic microvascular ischemia.     CERVICAL SPINE MRI:  1.  Multilevel spondylosis with mild spinal canal stenosis at C5-C6, to a lesser extent at C3-C4. No abnormal spinal cord signal intensity.  2.  Multilevel foraminal narrowing, worst and severe at C5-C6 on the right, moderate-severe at C3-C4 and C5-C6 on the left. See above for details.     -facial numbness on day of discharge very minimal on right upper lip and tip thumb and index finder     -pt/ot eval  -neurology eval  -plavix + asa x 3 weeks, then asa  -home cardiac monitor ordered  -follow up with neurology in 2  months, sooner if needed for hand numbness for EMG     -checked echo 5/8/25  -1. Normal left ventricular size and systolic performance. The visually  estimated ejection fraction is 60%.  2. No significant valvular heart disease is identified on this study.  3. Normal right ventricular size and systolic performance.  4. Echo contrast examination was performed using agitated crystalloid as  contrast agent. The right heart opacity was good and the left heart was well  visualized. There was no evidence of right-to-left shunting during spontaneous  respiration or following release of Valsalva.     -carotid ultrasound  1.  Mild plaque formation, velocities consistent with less than 50% stenosis in the right internal carotid artery.  2.  Mild plaque formation, velocities consistent with less than 50% stenosis in the left internal carotid artery.  3.  Flow within the vertebral arteries is antegrade.     2.Hx of htn  -allowing higher bp, for now restarted half his regular dose losartan and will follow up with PCP next week  -he will check bp at home and will have parameters      3.Lipids  -stopped Lipitor with arthralgias in past  -on Zetia   -neuro added back statin,checked ck here normal     4.Smokes marijuana    5.Gerd-while on Plavix will use protonix instead of omeprazole    6.A1c 5.7--pre-Dm--I told him and he thinks he can cut back on ice cream  treats and will follow with pcp            Discharge Medications with Med changes:     Current Discharge Medication List        START taking these medications    Details   aspirin (ASA) 81 MG EC tablet Take 1 tablet (81 mg) by mouth daily.  Qty: 30 tablet, Refills: 11    Associated Diagnoses: Ischemic stroke (H)      atorvastatin (LIPITOR) 40 MG tablet Take 1 tablet (40 mg) by mouth or NG Tube at bedtime.  Qty: 30 tablet, Refills: 1    Associated Diagnoses: Ischemic stroke (H)      clopidogrel (PLAVIX) 75 MG tablet Take 1 tablet (75 mg) by mouth daily.  Qty: 20 tablet, Refills: 0    Associated Diagnoses: Ischemic stroke (H)      pantoprazole (PROTONIX) 40 MG EC tablet Take 1 tablet (40 mg) by mouth every morning (before breakfast).  Qty: 30 tablet, Refills: 0    Associated Diagnoses: Gastritis without bleeding, unspecified chronicity, unspecified gastritis type           CONTINUE these medications which have CHANGED    Details   losartan (COZAAR) 25 MG tablet Take 1 tablet (25 mg) by mouth daily. Check blood pressure each day prior to taking. Hold if systolic blood pressure is <130 for the next week. Please see your doctor next week. Call your doctor if blood pressure is >190  Qty: 30 tablet, Refills: 0    Associated Diagnoses: Ischemic stroke (H)           CONTINUE these medications which have NOT CHANGED    Details   ezetimibe (ZETIA) 10 MG tablet Take 10 mg by mouth every evening.      GLUCOSAMINE-CHONDROITIN PO Take 1 tablet by mouth every evening.      Multiple Vitamins-Minerals (MENS 50+ MULTIVITAMIN PO) Take 1 tablet by mouth every morning.           STOP taking these medications       omeprazole (PRILOSEC) 20 MG capsule Comments:   Reason for Stopping:                     Rationale for medication changes:      Plavix x 20 days  Protonix for next month while on plavix, then go back omeprazole  Asa daily  Lipitor   Losartan 25 mg and he will check SBP and hold for next week if sbp<130 and follow up with  PCP        Consults       SMOKING CESSATION PROGRAM IP CONSULT  NEUROLOGY IP CONSULT  PHYSICAL THERAPY ADULT IP CONSULT  OCCUPATIONAL THERAPY ADULT IP CONSULT  SPEECH LANGUAGE PATH ADULT IP CONSULT  CARE MANAGEMENT / SOCIAL WORK IP CONSULT    Immunizations given this encounter     Most Recent Immunizations   Administered Date(s) Administered    COVID-19 MONOVALENT 12+ (Pfizer) 05/12/2021    COVID-19 Monovalent 18+ (Moderna) 12/22/2021    Flu, Unspecified 11/05/2010    Influenza Vaccine >6 months,quad, PF 09/21/2014           Anticoagulation Information      Recent INR results:   Recent Labs   Lab 05/07/25  1450   INR 0.95     Warfarin doses (if applicable) or name of other anticoagulant: none      SIGNIFICANT IMAGING FINDINGS     Results for orders placed or performed during the hospital encounter of 05/07/25   CTA Head Neck with Contrast    Impression    IMPRESSION:   HEAD CT:  1.  No evidence of intracranial hemorrhage, large territorial infarct, or other acute intracranial abnormality.    HEAD CTA:   1.  No high-grade stenosis or occlusion of the major arteries in the head.    NECK CTA:  1.  No high-grade stenosis or occlusion of the major arteries in the neck.        Imaging findings discussed with Dr. Shi by Dr. Garcia on 5/7/2025 2:49 PM CDT   MR Brain w/o & w Contrast    Impression    IMPRESSION:  1.  No acute intracranial process.  2.  Mild chronic microvascular ischemic changes.     MR Brain w/o & w Contrast    Impression    IMPRESSION:  HEAD MRI:   1.  Punctate acute nonhemorrhagic left thalamic lacunar infarct without mass effect.  2.  Mild scattered bilateral cerebral white matter disease, nonspecific although most likely the sequela of chronic microvascular ischemia.    CERVICAL SPINE MRI:  1.  Multilevel spondylosis with mild spinal canal stenosis at C5-C6, to a lesser extent at C3-C4. No abnormal spinal cord signal intensity.  2.  Multilevel foraminal narrowing, worst and severe at C5-C6 on the  right, moderate-severe at C3-C4 and C5-C6 on the left. See above for details.   MR Cervical Spine w/o & w Contrast    Impression    IMPRESSION:  HEAD MRI:   1.  Punctate acute nonhemorrhagic left thalamic lacunar infarct without mass effect.  2.  Mild scattered bilateral cerebral white matter disease, nonspecific although most likely the sequela of chronic microvascular ischemia.    CERVICAL SPINE MRI:  1.  Multilevel spondylosis with mild spinal canal stenosis at C5-C6, to a lesser extent at C3-C4. No abnormal spinal cord signal intensity.  2.  Multilevel foraminal narrowing, worst and severe at C5-C6 on the right, moderate-severe at C3-C4 and C5-C6 on the left. See above for details.   US Carotid Bilateral    Impression    IMPRESSION:  1.  Mild plaque formation, velocities consistent with less than 50% stenosis in the right internal carotid artery.  2.  Mild plaque formation, velocities consistent with less than 50% stenosis in the left internal carotid artery.  3.  Flow within the vertebral arteries is antegrade.       SIGNIFICANT LABORATORY FINDINGS     Most Recent 3 CBC's:  Recent Labs   Lab Test 05/07/25  1450 04/10/23  1153 08/23/21  1859   WBC 6.4 7.5 12.9*   HGB 16.9 17.5 16.5   MCV 87 92 91    205 244     Most Recent 3 BMP's:  Recent Labs   Lab Test 05/09/25  0725 05/08/25  1342 05/08/25  0646 05/07/25  2205 05/07/25  1450     --  139  --  139   POTASSIUM 4.5  --  4.2  --  3.7   CHLORIDE 102  --  102  --  102   CO2 31*  --  29  --  23   BUN 14.8  --  14.5  --  18.1   CR 0.96  --  0.81  --  0.89   ANIONGAP 6*  --  8  --  14   VIKTORIA 9.5  --  8.8  --  10.2   * 107* 103*   < > 88    < > = values in this interval not displayed.     Most Recent 2 LFT's:  Recent Labs   Lab Test 09/15/18  1012   AST 32   ALT 34   ALKPHOS 59   BILITOTAL 1.0       US Carotid Bilateral  Result Date: 5/8/2025  EXAM: US CAROTID BILATERAL LOCATION: [facility] DATE: 5/8/2025 INDICATION: 55 y o  with stroke COMPARISON: None. TECHNIQUE: Duplex exam performed utilizing 2D gray-scale imaging, Doppler interrogation with color-flow and spectral waveform analysis. The percent diameter stenosis is determined using Updated Recommendations for Carotid Stenosis Interpretation Criteria  from [employer] [facility]. FINDINGS: RIGHT: Mild plaque at the bifurcation. The peak systolic velocity in the ICA is less than 180 cm/sec, consistent with less than 50% stenosis. Normal velocities in the ECA. Antegrade flow within the vertebral artery. LEFT: Mild plaque at the bifurcation. The peak systolic velocity in the ICA is less than 180 cm/sec, consistent with less than 50% stenosis. Normal velocities in the ECA. Antegrade flow within the vertebral artery. VELOCITY CHART: CCA   Right: 92 cm/s   Left: 106 cm/s ICA   Right: 80 cm/s   Left: 104 cm/s ECA   Right: 126 cm/s   Left: 110 cm/s ICA/CCA PSV Ratio   Right: 0.9   Left: 1.0     IMPRESSION: 1.  Mild plaque formation, velocities consistent with less than 50% stenosis in the right internal carotid artery. 2.  Mild plaque formation, velocities consistent with less than 50% stenosis in the left internal carotid artery. 3.  Flow within the vertebral arteries is antegrade.    Echocardiogram Complete  Result Date: 2025  [id number]^GONDAL^[place] [address]  Name: CHRIS ZAPATA MRN: [id number] : 1970 Study Date: 2025 03:29 PM Age: 55 yrs Gender: Male Patient Location: [facility] Reason For Study: TIA Ordering Physician: GONDAL, SAAD J Performed By: THOMAS  BSA: 2.0 m2 Height: 67 in Weight: 199 lb HR: 67 BP: 189/95 mmHg ______________________________________________________________________________ Procedure Bubble Echocardiogram with two-dimensional, color and spectral Doppler. ______________________________________________________________________________ Interpretation Summary  1. Normal left  ventricular size and systolic performance. The visually estimated ejection fraction is 60%. 2. No significant valvular heart disease is identified on this study. 3. Normal right ventricular size and systolic performance. 4. Echo contrast examination was performed using agitated crystalloid as contrast agent. The right heart opacity was good and the left heart was well visualized. There was no evidence of right-to-left shunting during spontaneous respiration or following release of Valsalva. ______________________________________________________________________________ Left ventricle: Normal left ventricular size and systolic performance. The visually estimated ejection fraction is 60%. There is normal regional wall motion. Left ventricular wall thickness is normal.  Assessment of LV Diastolic Function: The cumulative findings suggest normal diastolic filling [The septal e' velocity is > 7 cm/s & lateral e' velocity is > 10 cm/s. The average E/e' is < 14. TR velocity is < 2.8 m/s. Left atrial volume index is less than 34 mL/mÂ ].  Right ventricle: Normal right ventricular size and systolic performance.  Left atrium: The left atrium is of normal size.  Right atrium: The right atrium is of normal size.  IVC: The IVC is of normal caliber.  Aortic valve: The aortic valve is comprised of three cusps. No significant aortic stenosis or aortic insufficiency is detected on this study.  Mitral valve: The mitral valve appears morphologically normal. There is trace mitral insufficiency.  Tricuspid valve: The tricuspid valve is grossly morphologically normal. There is trace tricuspid insufficiency.  Pulmonic valve: The pulmonic valve is grossly morphologically normal.  Thoracic aorta: There is borderline enlargement of the proximal ascending aorta.  Pericardium: There is no significant pericardial effusion. ______________________________________________________________________________  ______________________________________________________________________________ MMode/2D Measurements & Calculations IVSd: 0.95 cm LVIDd: 4.5 cm LVIDs: 3.2 cm LVPWd: 1.00 cm FS: 28.5 % LV mass(C)d: 149.1 grams LV mass(C)dI: 73.9 grams/m2 Ao root diam: 3.5 cm LA dimension: 3.2 cm asc Aorta Diam: 3.8 cm LA/Ao: 0.91 LVOT diam: 2.1 cm LVOT area: 3.5 cm2 Ao root diam index Ht(cm/m): 2.1 Ao root diam index BSA (cm/m2): 1.7 Asc Ao diam index BSA (cm/m2): 1.9 Asc Ao diam index Ht(cm/m): 2.2 EF Biplane: 57.5 % LA Volume (BP): 49.9 ml  LA Volume Index (BP): 24.7 ml/m2 LA Volume Indexed (AL/bp): 26.7 ml/m2 RV Base: 4.0 cm RWT: 0.44  TAPSE: 2.7 cm  Time Measurements MM HR: 67.0 BPM  Doppler Measurements & Calculations MV E max olivier: 61.7 cm/sec MV A max olivier: 56.1 cm/sec MV E/A: 1.1 MV dec slope: 250.0 cm/sec2 MV dec time: 0.25 sec Ao V2 max: 140.0 cm/sec Ao max P.0 mmHg Ao V2 mean: 85.5 cm/sec Ao mean PG: 3.0 mmHg Ao V2 VTI: 25.5 cm JONH(I,D): 2.9 cm2 JONH(V,D): 2.7 cm2 LV V1 max P.9 mmHg LV V1 max: 111.0 cm/sec LV V1 VTI: 21.6 cm SV(LVOT): 74.8 ml SI(LVOT): 37.1 ml/m2 PA acc time: 0.14 sec TR max olivier: 221.0 cm/sec TR max P.5 mmHg AV Olivier Ratio (DI): 0.79 JONH Index (cm2/m2): 1.5 E/E': 5.6  E/E' av.2 Lateral E/e': 5.6 Medial E/e': 6.9 Peak E' Olivier: 11.1 cm/sec RV S Olivier: 11.9 cm/sec  ______________________________________________________________________________ Report approved by: Stan Lee MD on 2025 07:55 AM       MR Brain w/o & w Contrast  Result Date: 2025  EXAM: MR CERVICAL SPINE W/O and W CONTRAST, MR BRAIN W/O and W CONTRAST LOCATION: [facility] DATE: 2025 INDICATION: Recurrence of stroke like symptoms with right-sided paresthesias of the face, neck, and hand COMPARISON: Brain MRI dated 2025 CONTRAST: 9mL Gadavist TECHNIQUE: 1) Routine multiplanar multisequence head MRI without and with intravenous contrast 2) Routine Cervical Spine MRI without and with IV  contrast FINDINGS: HEAD MRI: INTRACRANIAL CONTENTS: Not appreciated on the comparison exam, a subtle punctate left thalamic focus of restricted diffusion with subtle corresponding low ADC values and T2/FLAIR hyperintensity without mass effect, most consistent with an acute infarct (for example, series 3 image 53; series 6 image 61; and series 5 image 14). No acute hemorrhage, extra axial fluid collection, mass, or abnormal enhancement. Several additional scattered foci of T2 prolongation within the bilateral cerebral white matter,  nonspecific although most likely the sequela of mild chronic microvascular ischemia. Normal ventricles and sulci for age. Normal position of the cerebellar tonsils. SELLA: Within technique limitations, no gross abnormality. OSSEOUS STRUCTURES/SOFT TISSUES: No suspicious bone marrow signal intensity. The major intracranial vascular flow voids are maintained. ORBITS: Within technique limitations, no significant abnormality. SINUSES/MASTOIDS: Trace scattered paranasal sinus mucosal thickening without an air-fluid level. No mastoid effusion.  CERVICAL SPINE: Motion degraded exam. Within this overall mild limitation: Reversal of the normal cervical lordosis without significant spondylolisthesis. Maintained vertebral body heights. Multilevel interbody degenerative change with intervertebral disc desiccation, worst at C5-C6 where there is mild associated disc height loss and Modic type II/III degenerative change. No suspicious bone marrow signal intensity or significant focal edema. No pathologic restricted diffusion or enhancement. Unremarkable spinal cord signal intensity. Incidental 2.4 x 1.5 x 3.0 cm subcutaneous lipoma within the right dorsal neck. Otherwise, within technique limitations, grossly unremarkable extraspinal structures. Craniovertebral junction and C1-C2: Mild atlantodental and bilateral atlantooccipital degenerative change without significant spinal canal stenosis. C2-C3: A  tiny posterior disc-osteophyte complex and mild buckling of the ligamenta flava without significant spinal canal stenosis. Bilateral uncovertebral and facet arthrosis, worse on the left where there is mild foraminal narrowing. No significant right foraminal stenosis. C3-C4: A posterior disc-osteophyte complex including a left paracentral disc protrusion which, along with buckling of the ligamenta flava and uncovertebral arthropathy (worse and moderate on the left), contributes to mild spinal canal stenosis with effacement of the left lateral recess and hemicord. Mild left facet arthrosis. Moderate-severe left and mild right foraminal narrowing. C4-C5: A posterior disc-osteophyte complex including a central disc protrusion and buckling of the ligamenta flava effacing the ventral spinal cord without significant spinal canal stenosis. Mild bilateral uncovertebral and facet arthrosis with mild right foraminal narrowing. No significant left foraminal stenosis. C5-C6: Posterior disc-osteophyte complex and buckling of the ligamenta flava with mild spinal canal stenosis. Severe right and moderate-severe left foraminal narrowing predominantly due to uncovertebral arthropathy with mild bilateral facet arthrosis. C6-C7: A posterior disc-osteophyte complex partially effacing the ventral thecal sac without significant spinal canal stenosis. Moderate left and mild right foraminal narrowing predominantly due to uncovertebral arthropathy with mild bilateral facet arthrosis. C7-T1: A small posterior disc-osteophyte complex effacing the ventral thecal sac without significant spinal canal stenosis. Bilateral uncovertebral and facet arthrosis with mild-moderate left foraminal narrowing. No significant right foraminal stenosis.     IMPRESSION: HEAD MRI: 1.  Punctate acute nonhemorrhagic left thalamic lacunar infarct without mass effect. 2.  Mild scattered bilateral cerebral white matter disease, nonspecific although most likely the  sequela of chronic microvascular ischemia. CERVICAL SPINE MRI: 1.  Multilevel spondylosis with mild spinal canal stenosis at C5-C6, to a lesser extent at C3-C4. No abnormal spinal cord signal intensity. 2.  Multilevel foraminal narrowing, worst and severe at C5-C6 on the right, moderate-severe at C3-C4 and C5-C6 on the left. See above for details.    MR Cervical Spine w/o & w Contrast  Result Date: 5/8/2025  EXAM: MR CERVICAL SPINE W/O and W CONTRAST, MR BRAIN W/O and W CONTRAST LOCATION: [facility] DATE: 5/8/2025 INDICATION: Recurrence of stroke like symptoms with right-sided paresthesias of the face, neck, and hand COMPARISON: Brain MRI dated 5/7/2025 CONTRAST: 9mL Gadavist TECHNIQUE: 1) Routine multiplanar multisequence head MRI without and with intravenous contrast 2) Routine Cervical Spine MRI without and with IV contrast FINDINGS: HEAD MRI: INTRACRANIAL CONTENTS: Not appreciated on the comparison exam, a subtle punctate left thalamic focus of restricted diffusion with subtle corresponding low ADC values and T2/FLAIR hyperintensity without mass effect, most consistent with an acute infarct (for example, series 3 image 53; series 6 image 61; and series 5 image 14). No acute hemorrhage, extra axial fluid collection, mass, or abnormal enhancement. Several additional scattered foci of T2 prolongation within the bilateral cerebral white matter,  nonspecific although most likely the sequela of mild chronic microvascular ischemia. Normal ventricles and sulci for age. Normal position of the cerebellar tonsils. SELLA: Within technique limitations, no gross abnormality. OSSEOUS STRUCTURES/SOFT TISSUES: No suspicious bone marrow signal intensity. The major intracranial vascular flow voids are maintained. ORBITS: Within technique limitations, no significant abnormality. SINUSES/MASTOIDS: Trace scattered paranasal sinus mucosal thickening without an air-fluid level. No mastoid effusion.  CERVICAL  SPINE: Motion degraded exam. Within this overall mild limitation: Reversal of the normal cervical lordosis without significant spondylolisthesis. Maintained vertebral body heights. Multilevel interbody degenerative change with intervertebral disc desiccation, worst at C5-C6 where there is mild associated disc height loss and Modic type II/III degenerative change. No suspicious bone marrow signal intensity or significant focal edema. No pathologic restricted diffusion or enhancement. Unremarkable spinal cord signal intensity. Incidental 2.4 x 1.5 x 3.0 cm subcutaneous lipoma within the right dorsal neck. Otherwise, within technique limitations, grossly unremarkable extraspinal structures. Craniovertebral junction and C1-C2: Mild atlantodental and bilateral atlantooccipital degenerative change without significant spinal canal stenosis. C2-C3: A tiny posterior disc-osteophyte complex and mild buckling of the ligamenta flava without significant spinal canal stenosis. Bilateral uncovertebral and facet arthrosis, worse on the left where there is mild foraminal narrowing. No significant right foraminal stenosis. C3-C4: A posterior disc-osteophyte complex including a left paracentral disc protrusion which, along with buckling of the ligamenta flava and uncovertebral arthropathy (worse and moderate on the left), contributes to mild spinal canal stenosis with effacement of the left lateral recess and hemicord. Mild left facet arthrosis. Moderate-severe left and mild right foraminal narrowing. C4-C5: A posterior disc-osteophyte complex including a central disc protrusion and buckling of the ligamenta flava effacing the ventral spinal cord without significant spinal canal stenosis. Mild bilateral uncovertebral and facet arthrosis with mild right foraminal narrowing. No significant left foraminal stenosis. C5-C6: Posterior disc-osteophyte complex and buckling of the ligamenta flava with mild spinal canal stenosis. Severe right  and moderate-severe left foraminal narrowing predominantly due to uncovertebral arthropathy with mild bilateral facet arthrosis. C6-C7: A posterior disc-osteophyte complex partially effacing the ventral thecal sac without significant spinal canal stenosis. Moderate left and mild right foraminal narrowing predominantly due to uncovertebral arthropathy with mild bilateral facet arthrosis. C7-T1: A small posterior disc-osteophyte complex effacing the ventral thecal sac without significant spinal canal stenosis. Bilateral uncovertebral and facet arthrosis with mild-moderate left foraminal narrowing. No significant right foraminal stenosis.     IMPRESSION: HEAD MRI: 1.  Punctate acute nonhemorrhagic left thalamic lacunar infarct without mass effect. 2.  Mild scattered bilateral cerebral white matter disease, nonspecific although most likely the sequela of chronic microvascular ischemia. CERVICAL SPINE MRI: 1.  Multilevel spondylosis with mild spinal canal stenosis at C5-C6, to a lesser extent at C3-C4. No abnormal spinal cord signal intensity. 2.  Multilevel foraminal narrowing, worst and severe at C5-C6 on the right, moderate-severe at C3-C4 and C5-C6 on the left. See above for details.    MR Brain w/o & w Contrast  Result Date: 5/7/2025  EXAM: MR BRAIN W/O and W CONTRAST LOCATION: [facility] DATE: 5/7/2025 INDICATION: Numbness R face R hand, R facial droop COMPARISON: MRI 4/10/2023, CT 5/7/2025. CONTRAST: 9 mL Gadavist TECHNIQUE: Routine multiplanar multisequence head MRI without and with intravenous contrast. FINDINGS: INTRACRANIAL CONTENTS: No acute or subacute infarct. No mass, acute hemorrhage, or extra-axial fluid collections. Scattered nonspecific T2/FLAIR hyperintensities within the cerebral white matter most consistent with mild chronic microvascular ischemic change. Normal ventricles and sulci. Normal position of the cerebellar tonsils. No pathologic contrast enhancement. SELLA: No  abnormality accounting for technique. OSSEOUS STRUCTURES/SOFT TISSUES: Normal marrow signal. The major intracranial vascular flow voids are maintained. ORBITS: No abnormality accounting for technique. SINUSES/MASTOIDS: No paranasal sinus mucosal disease. No middle ear or mastoid effusion.     IMPRESSION: 1.  No acute intracranial process. 2.  Mild chronic microvascular ischemic changes.     CTA Head Neck with Contrast  Result Date: 5/7/2025  EXAM: CTA HEAD NECK W CONTRAST LOCATION: [facility] DATE: 5/7/2025 INDICATION: Code Stroke, evaluate for LVO. PLEASE READ IMMEDIATELY. Right facial droop and right hand numbness. COMPARISON: None. CONTRAST: isovue 370 67ml TECHNIQUE: Head and neck CT angiogram with IV contrast. Noncontrast head CT followed by axial helical CT images of the head and neck vessels obtained during the arterial phase of intravenous contrast administration. Axial 2D reconstructed images and multiplanar 3D MIP reconstructed images of the head and neck vessels were performed by the technologist. Dose reduction techniques were used. All stenosis measurements made according to NASCET criteria unless otherwise specified. FINDINGS: NONCONTRAST HEAD CT: INTRACRANIAL CONTENTS: No intracranial hemorrhage, extraaxial collection, or mass effect.  No CT evidence of acute infarct. Normal parenchymal attenuation. Normal ventricles and sulci. VISUALIZED ORBITS/SINUSES/MASTOIDS: No intraorbital abnormality. No paranasal sinus mucosal disease. No middle ear or mastoid effusion. BONES/SOFT TISSUES: No acute abnormality. HEAD CTA: ANTERIOR CIRCULATION: There is minimal atherosclerotic plaque cavernous ICA segments. No stenosis/occlusion, aneurysm, or high flow vascular malformation. Developmentally hypoplastic right A1 anterior cerebral artery segment. Fetal supply of the right posterior cerebral artery. POSTERIOR CIRCULATION: No stenosis/occlusion, aneurysm, or high flow vascular malformation.  Balanced vertebral arteries supply a normal basilar artery. DURAL VENOUS SINUSES: Expected enhancement of the major dural venous sinuses. NECK CTA: RIGHT CAROTID: No measurable stenosis or dissection. LEFT CAROTID: There is minimal atherosclerotic plaque the left carotid bifurcation. No measurable stenosis or dissection. VERTEBRAL ARTERIES: No focal stenosis or dissection. Balanced vertebral arteries. AORTIC ARCH: Classic aortic arch anatomy with no significant stenosis at the origin of the great vessels. NONVASCULAR STRUCTURES: There is moderate degenerative disc disease at C5-C6 with disc osteophyte complex resulting in moderate spinal canal stenosis.     IMPRESSION: HEAD CT: 1.  No evidence of intracranial hemorrhage, large territorial infarct, or other acute intracranial abnormality. HEAD CTA: 1.  No high-grade stenosis or occlusion of the major arteries in the head. NECK CTA: 1.  No high-grade stenosis or occlusion of the major arteries in the neck. Imaging findings discussed with Dr. Shi by Dr. Garcia on 5/7/2025 2:49 PM CDT        Discharge Orders        Reason for your hospital stay    Acute stroke     Activity    Your activity upon discharge: activity as tolerated     Follow Up    Follow up with neurology clinic 2 months for stoke follow up and possible EMG     Adult Cardiac Mobile Telemetry Monitor     Diet    Follow this diet upon discharge: Current Diet:Orders Placed This Encounter      Regular Diet Adult     Hospital Follow-up with Existing Primary Care Provider (PCP)            Examination   Physical Exam   Temp:  [97.5  F (36.4  C)-98  F (36.7  C)] 97.5  F (36.4  C)  Pulse:  [73-83] 75  Resp:  [18-20] 18  BP: (140-160)/() 145/100  SpO2:  [95 %-96 %] 96 %  Wt Readings from Last 1 Encounters:   05/09/25 89.9 kg (198 lb 3.1 oz)       See today's note    Please see EMR for more detailed significant labs, imaging, consultant notes etc.    IDo MD, personally saw the patient  today and spent greater than 30 minutes discharging this patient.    Do West MD  [facility]    CC:Osvaldo Telles

## 2025-05-09 NOTE — PLAN OF CARE
Goal Outcome Evaluation:       Joseph denied dizziness. Good appetite. Slight decrease  in sensation on NIH. HE is A&O x4. Discharge orders reviewed with him. Belongings sent home.

## 2025-05-09 NOTE — PROGRESS NOTES
Care Management Discharge Note    Discharge Date: 05/09/2025       Discharge Disposition:  home with wife    Discharge Services:  none    Discharge DME:  none    Discharge Transportation:  family    Private pay costs discussed: Not applicable    Does the patient's insurance plan have a 3 day qualifying hospital stay waiver?  No    PAS Confirmation Code:  NA  Patient/family educated on Medicare website which has current facility and service quality ratings:  NA    Education Provided on the Discharge Plan:  yes  Persons Notified of Discharge Plans: patient  Patient/Family in Agreement with the Plan:  yes    Handoff Referral Completed: No, handoff not indicated or clinically appropriate    Additional Information:  Patient to discharge to home with family. He is alert,oriented and independent. No needs identified.    LUCY Narayan

## 2025-05-10 ENCOUNTER — PATIENT OUTREACH (OUTPATIENT)
Dept: CARE COORDINATION | Facility: CLINIC | Age: 55
End: 2025-05-10
Payer: COMMERCIAL

## 2025-05-10 NOTE — PROGRESS NOTES
Transitions of Care Outreach  Chief Complaint   Patient presents with    Clinic Care Coordination - Post Hospital       Most Recent Admission Date: 5/7/2025   Most Recent Admission Diagnosis: TIA (transient ischemic attack) - G45.9  Paresthesia - R20.2  Facial droop - R29.810     Most Recent Discharge Date: 5/9/2025   Most Recent Discharge Diagnosis: Facial droop - R29.810  Paresthesia - R20.2  TIA (transient ischemic attack) - G45.9  Ischemic stroke (H) - I63.9  Gastritis without bleeding, unspecified chronicity, unspecified gastritis type - K29.70     Transitions of Care Assessment    Discharge Assessment  How are you doing now that you are home?: Fantastic.  How are your symptoms? (Red Flag symptoms escalate to triage hotline per guidelines): Improved  Do you know how to contact your clinic care team if you have future questions or changes to your health status? : Yes  Does the patient have their discharge instructions? : Yes  Does the patient have questions regarding their discharge instructions? : No  Were you started on any new medications or were there changes to any of your previous medications? : Yes  Does the patient have all of their medications?: Yes  Do you have questions regarding any of your medications? : No  Do you have all of your needed medical supplies or equipment (DME)?  (i.e. oxygen tank, CPAP, cane, etc.): Yes    Post-op ([facility] CTA Only)  If the patient had a surgery or procedure, do they have any questions for a nurse?: No      Follow up Plan     Discharge Follow-Up  Discharge follow up appointment scheduled in alignment with recommended follow up timeframe or Transitions of Risk Category? (Low = within 30 days; Moderate= within 14 days; High= within 7 days): No (Patient will contact PCP on Monday for follow up appt.)    No future appointments.    Outpatient Plan as outlined on AVS reviewed with patient.    For any urgent concerns, please contact our 24 hour nurse triage line: [phone]  ([phone])       Jennifer Edouard, [facility]  [phone]  [facility]

## 2025-05-10 NOTE — PROGRESS NOTES
NEUROLOGY PROGRESS NOTE     Joseph Ochoa,  1970, MRN [id number] Date: 2025     [facility]   Code status:  Prior   PCP: Osvaldo Telles, [phone]      ASSESSMENT & PLAN   Diagnosis code: Strokelike symptoms    Strokelike symptoms-lacunar stroke  Numbness of the right face and hand    MRI brain negative for acute stroke  Repeat MRI brain shows a left thalamic lacunar infarct which would fit with his symptoms.  MRI C-spine shows right C5-C6 neuroforaminal stenosis which could be causing the arm numbness but would not fit with the face numbness.  Consider an EMG if symptoms have not completely improved in the next month.  CT angiogram negative for large vessel occlusion  Echocardiogram shows 60% ejection fraction/14-day cardiac monitoring  Lipid panel statin- on 40 mg of Lipitor  Aspirin Plavix for 3 weeks followed by aspirin only  Blood pressure can slowly be normalized depending on results of the MRI  PT/OT    Discharge:-Per primary team.  Will arrange outpatient follow-up.       Chief Complaint   Patient presents with    Numbness        HISTORY OF PRESENT ILLNESS     We have been requested by Dr. Loera to evaluate Joseph Ochoa who is a 55 year old  male for evaluation of strokelike symptoms.  This is a 55-year-old male who presented to the emergency room with sudden right sided numbness involving the hand and face.  He does have a history of hypertension and hyperlipidemia.  Symptoms were not provoked by any particular activity he was working in the yard.  They are slowly getting better at this point.  He was complaining of paresthesias in the first 3 digits of the right hand along with V2 V3 distribution of the right face.  No headaches.  No chest pains or palpitations.  No pain.  No neck pain.  Has seen a chiropractic in the past.  He was not a candidate for thrombolytics.  MRI C-spine/repeat MRI brain was recommended by the stroke neurologist.  Patient was started on  aspirin and Plavix.  At baseline he does not take any aspirin.    5/9  MRI has not shown a stroke.  We discussed about possible cervical radiculopathy and he reports that he has had a EMG for carpal tunnel in the past.  His symptoms have been negative.  Denies any major neck pain.  No shooting pain down the arm.  Potentially discharge later today.     PAST MEDICAL & SURGICAL HISTORY     Medical History  History of hypertension, hyperlipidemia    Surgical History  Past Surgical History:   Procedure Laterality Date    APPENDECTOMY          SOCIAL HISTORY     Social History     Tobacco Use    Smoking status: Never    Smokeless tobacco: Never   Substance Use Topics    Alcohol use: Yes     Comment: Alcoholic Drinks/day: occasional    Drug use: Yes     Types: Marijuana     Comment: Drug use: Daily marijuana use        FAMILY HISTORY     Reviewed, and family history includes Depression in his mother; Diabetes in his maternal grandfather, maternal grandmother, and mother; Heart Disease in his maternal grandfather, maternal grandmother, and paternal grandmother; Hypertension in his maternal grandfather, maternal grandmother, and paternal grandmother; Substance Abuse in his mother.     ALLERGIES     Allergies   Allergen Reactions    Ibuprofen Other (See Comments)     Kidney problems   IGA nephrothy    Lisinopril Other (See Comments)        REVIEW OF SYSTEMS     Complete review of systems done and negative within the HPI.  Pertinent positives on the HPI.     HOME & HOSPITAL MEDICATIONS     Prior to Admission Medications  No medications prior to admission.       Hospital Medications  No current facility-administered medications for this encounter.        PHYSICAL EXAM     Vital signs  Temp:  [97.5  F (36.4  C)-98  F (36.7  C)] 97.5  F (36.4  C)  Pulse:  [75-83] 75  Resp:  [18] 18  BP: (140-145)/() 145/100  SpO2:  [95 %-96 %] 96 %      PHYSICAL EXAMINATION  VITALS: BP (!) 145/100 (BP Location: Left arm)   Pulse 75   Temp  "97.5  F (36.4  C) (Oral)   Resp 18   Ht 1.702 m (5' 7\")   Wt 89.9 kg (198 lb 3.1 oz)   SpO2 96%   BMI 31.04 kg/m    GENERAL -Health appearing, No apparent distress  EYES- No scleral icterus, no eyelid droop, Pupils - see Neuro section  HEENT - Normocephalic, atraumatic, Hearing grossly intact; Oral mucosa moist and pink in color. External Ears and nose intact.   Neck - supple   PULM - Good spontaneous respiratory effort  CV-extremities warm and well-perfused.  MSK- Gait - see Neuro section; Strength and tone- see Neuro section; Range of motion grossly intact.  PSYCH -cooperative    Neurological  Mental status - Patient is awake and grossly oriented to self, place and time. Attention span is normal. Language is fluent and follows commands appropriately.   Cranial nerves - CN II-XII intact. Pupils are reactive and symmetric; EOMI, VFIT, NLF symmetric  Motor - There is no pronator drift. Motor exam shows 5/5 strength in all extremities.  Tone - Tone is symmetric bilaterally in upper and lower extremities.  Reflexes -toes equivocal.  Sensation - Sensory exam is grossly intact to light touch, pain.  Coordination - Finger to nose and heel to shin is without dysmetria.  Gait and station --able to ambulate with assistance.  Formal gait testing cannot be done due to safety concerns from ongoing medical medications.  Exam stable.     DIAGNOSTIC STUDIES     Pertinent Radiology   MRI brain  IMPRESSION:  1.  No acute intracranial process.  2.  Mild chronic microvascular ischemic changes.    HEAD CT:  1.  No evidence of intracranial hemorrhage, large territorial infarct, or other acute intracranial abnormality.     HEAD CTA:   1.  No high-grade stenosis or occlusion of the major arteries in the head.     NECK CTA:  1.  No high-grade stenosis or occlusion of the major arteries in the neck.      HEAD MRI: Images reviewed  1.  Punctate acute nonhemorrhagic left thalamic lacunar infarct without mass effect.  2.  Mild scattered " bilateral cerebral white matter disease, nonspecific although most likely the sequela of chronic microvascular ischemia.     CERVICAL SPINE MRI:-Images reviewed  1.  Multilevel spondylosis with mild spinal canal stenosis at C5-C6, to a lesser extent at C3-C4. No abnormal spinal cord signal intensity.  2.  Multilevel foraminal narrowing, worst and severe at C5-C6 on the right, moderate-severe at C3-C4 and C5-C6 on the left. See above for details.    Carotid US  IMPRESSION:  1.  Mild plaque formation, velocities consistent with less than 50% stenosis in the right internal carotid artery.  2.  Mild plaque formation, velocities consistent with less than 50% stenosis in the left internal carotid artery.  3.  Flow within the vertebral arteries is antegrade.    ECHO  1. Normal left ventricular size and systolic performance. The visually  estimated ejection fraction is 60%.  2. No significant valvular heart disease is identified on this study.  3. Normal right ventricular size and systolic performance.  4. Echo contrast examination was performed using agitated crystalloid as  contrast agent. The right heart opacity was good and the left heart was well  visualized. There was no evidence of right-to-left shunting during spontaneous  respiration or following release of Valsalva.    LABS  Component      Latest Ref Rng 5/7/2025  2:50 PM   Cholesterol      <200 mg/dL 222 (H)    Triglycerides      <150 mg/dL 167 (H)    HDL Cholesterol      >=40 mg/dL 62    LDL Cholesterol Calculated      <100 mg/dL 127 (H)    Non HDL Cholesterol      <130 mg/dL 160 (H)    Estimated Average Glucose      <117 mg/dL 117 (H)    Hemoglobin A1C      <5.7 % 5.7 (H)       Legend:  (H) High    Recent Results (from the past 24 hours)   Basic metabolic panel    Collection Time: 05/09/25  7:25 AM   Result Value Ref Range    Sodium 139 135 - 145 mmol/L    Potassium 4.5 3.4 - 5.3 mmol/L    Chloride 102 98 - 107 mmol/L    Carbon Dioxide (CO2) 31 (H) 22 - 29  mmol/L    Anion Gap 6 (L) 7 - 15 mmol/L    Urea Nitrogen 14.8 6.0 - 20.0 mg/dL    Creatinine 0.96 0.67 - 1.17 mg/dL    GFR Estimate >90 >60 mL/min/1.73m2    Calcium 9.5 8.8 - 10.4 mg/dL    Glucose 114 (H) 70 - 99 mg/dL   Extra Purple Top EDTA (LAB USE ONLY)    Collection Time: 05/09/25  7:25 AM   Result Value Ref Range    Hold Specimen JIC        Total time spent for face to face visit, reviewing labs/imaging studies, counseling and coordination of care was: Over 50 min  More than 50% of this time was spent on counseling and coordination of care.    Counseling patient.  Discharge planning.  Review of MRI results with the patient.  Discussion of MRI results with the hospitalist.    Jesse Turner MD  Neurologist  [facility]  Tel:- [phone]

## 2025-05-12 LAB
ATRIAL RATE - MUSE: 68 BPM
DIASTOLIC BLOOD PRESSURE - MUSE: 113 MMHG
INTERPRETATION ECG - MUSE: NORMAL
P AXIS - MUSE: 73 DEGREES
PR INTERVAL - MUSE: 162 MS
QRS DURATION - MUSE: 92 MS
QT - MUSE: 380 MS
QTC - MUSE: 404 MS
R AXIS - MUSE: 39 DEGREES
SYSTOLIC BLOOD PRESSURE - MUSE: 199 MMHG
T AXIS - MUSE: 58 DEGREES
VENTRICULAR RATE- MUSE: 68 BPM

## 2025-06-09 PROCEDURE — 93272 ECG/REVIEW INTERPRET ONLY: CPT | Performed by: INTERNAL MEDICINE
